# Patient Record
Sex: FEMALE | Race: WHITE | NOT HISPANIC OR LATINO | Employment: UNEMPLOYED | URBAN - METROPOLITAN AREA
[De-identification: names, ages, dates, MRNs, and addresses within clinical notes are randomized per-mention and may not be internally consistent; named-entity substitution may affect disease eponyms.]

---

## 2017-02-13 ENCOUNTER — AMBULATORY - HEALTHEAST (OUTPATIENT)
Dept: ADMINISTRATIVE | Facility: REHABILITATION | Age: 37
End: 2017-02-13

## 2017-02-13 DIAGNOSIS — M24.159 LABRAL TEAR OF HIP, DEGENERATIVE: ICD-10-CM

## 2017-02-13 DIAGNOSIS — M25.559 HIP PAIN: ICD-10-CM

## 2017-02-13 DIAGNOSIS — Z98.890 STATUS POST SURGERY: ICD-10-CM

## 2017-02-15 ENCOUNTER — OFFICE VISIT - HEALTHEAST (OUTPATIENT)
Dept: PHYSICAL THERAPY | Facility: REHABILITATION | Age: 37
End: 2017-02-15

## 2017-02-15 DIAGNOSIS — M25.551 HIP PAIN, RIGHT: ICD-10-CM

## 2017-02-15 DIAGNOSIS — Z98.890 STATUS POST ARTHROSCOPY OF HIP: ICD-10-CM

## 2017-02-15 DIAGNOSIS — M62.81 MUSCLE WEAKNESS (GENERALIZED): ICD-10-CM

## 2017-02-23 ENCOUNTER — COMMUNICATION - HEALTHEAST (OUTPATIENT)
Dept: PHYSICAL THERAPY | Facility: REHABILITATION | Age: 37
End: 2017-02-23

## 2017-03-23 ENCOUNTER — RECORDS - HEALTHEAST (OUTPATIENT)
Dept: ADMINISTRATIVE | Facility: OTHER | Age: 37
End: 2017-03-23

## 2017-03-28 ENCOUNTER — HOSPITAL ENCOUNTER (OUTPATIENT)
Dept: MRI IMAGING | Facility: CLINIC | Age: 37
Discharge: HOME | End: 2017-03-28
Attending: NURSE PRACTITIONER

## 2017-03-28 DIAGNOSIS — M25.551 RIGHT HIP PAIN: ICD-10-CM

## 2017-05-01 ENCOUNTER — OFFICE VISIT - HEALTHEAST (OUTPATIENT)
Dept: FAMILY MEDICINE | Facility: CLINIC | Age: 37
End: 2017-05-01

## 2017-05-01 DIAGNOSIS — M79.604 RIGHT LEG PAIN: ICD-10-CM

## 2017-05-01 ASSESSMENT — MIFFLIN-ST. JEOR: SCORE: 1192.97

## 2017-05-08 ENCOUNTER — RECORDS - HEALTHEAST (OUTPATIENT)
Dept: ADMINISTRATIVE | Facility: OTHER | Age: 37
End: 2017-05-08

## 2017-05-15 ENCOUNTER — RECORDS - HEALTHEAST (OUTPATIENT)
Dept: ADMINISTRATIVE | Facility: OTHER | Age: 37
End: 2017-05-15

## 2017-06-01 ENCOUNTER — AMBULATORY - HEALTHEAST (OUTPATIENT)
Dept: ADMINISTRATIVE | Facility: REHABILITATION | Age: 37
End: 2017-06-01

## 2017-06-01 DIAGNOSIS — M54.50 LOW BACK PAIN: ICD-10-CM

## 2017-06-02 ENCOUNTER — OFFICE VISIT - HEALTHEAST (OUTPATIENT)
Dept: PHYSICAL THERAPY | Facility: REHABILITATION | Age: 37
End: 2017-06-02

## 2017-06-02 DIAGNOSIS — M62.81 MUSCLE WEAKNESS (GENERALIZED): ICD-10-CM

## 2017-06-02 DIAGNOSIS — M25.551 POSTERIOR PAIN OF HIP, RIGHT: ICD-10-CM

## 2017-06-09 ENCOUNTER — OFFICE VISIT - HEALTHEAST (OUTPATIENT)
Dept: PHYSICAL THERAPY | Facility: REHABILITATION | Age: 37
End: 2017-06-09

## 2017-06-09 DIAGNOSIS — M62.81 MUSCLE WEAKNESS (GENERALIZED): ICD-10-CM

## 2017-06-09 DIAGNOSIS — M25.551 POSTERIOR PAIN OF HIP, RIGHT: ICD-10-CM

## 2017-06-15 ENCOUNTER — COMMUNICATION - HEALTHEAST (OUTPATIENT)
Dept: PHYSICAL THERAPY | Facility: REHABILITATION | Age: 37
End: 2017-06-15

## 2017-11-08 ENCOUNTER — RECORDS - HEALTHEAST (OUTPATIENT)
Dept: ADMINISTRATIVE | Facility: OTHER | Age: 37
End: 2017-11-08

## 2017-11-22 ENCOUNTER — RECORDS - HEALTHEAST (OUTPATIENT)
Dept: ADMINISTRATIVE | Facility: OTHER | Age: 37
End: 2017-11-22

## 2017-12-13 ENCOUNTER — RECORDS - HEALTHEAST (OUTPATIENT)
Dept: ADMINISTRATIVE | Facility: OTHER | Age: 37
End: 2017-12-13

## 2018-01-24 ENCOUNTER — RECORDS - HEALTHEAST (OUTPATIENT)
Dept: ADMINISTRATIVE | Facility: OTHER | Age: 38
End: 2018-01-24

## 2018-04-12 ENCOUNTER — OFFICE VISIT - HEALTHEAST (OUTPATIENT)
Dept: FAMILY MEDICINE | Facility: CLINIC | Age: 38
End: 2018-04-12

## 2018-04-12 DIAGNOSIS — R14.0 ABDOMINAL BLOATING: ICD-10-CM

## 2018-04-12 DIAGNOSIS — R63.5 WEIGHT GAIN: ICD-10-CM

## 2018-04-12 DIAGNOSIS — L65.9 HAIR LOSS: ICD-10-CM

## 2018-04-12 DIAGNOSIS — L70.0 ACNE VULGARIS: ICD-10-CM

## 2018-04-12 LAB
ALBUMIN SERPL-MCNC: 3.9 G/DL (ref 3.5–5)
ALP SERPL-CCNC: 80 U/L (ref 45–120)
ALT SERPL W P-5'-P-CCNC: 14 U/L (ref 0–45)
ANION GAP SERPL CALCULATED.3IONS-SCNC: 14 MMOL/L (ref 5–18)
AST SERPL W P-5'-P-CCNC: 15 U/L (ref 0–40)
BILIRUB SERPL-MCNC: 0.3 MG/DL (ref 0–1)
BUN SERPL-MCNC: 18 MG/DL (ref 8–22)
CALCIUM SERPL-MCNC: 9.8 MG/DL (ref 8.5–10.5)
CHLORIDE BLD-SCNC: 106 MMOL/L (ref 98–107)
CO2 SERPL-SCNC: 23 MMOL/L (ref 22–31)
CREAT SERPL-MCNC: 0.68 MG/DL (ref 0.6–1.1)
ERYTHROCYTE [DISTWIDTH] IN BLOOD BY AUTOMATED COUNT: 10.8 % (ref 11–14.5)
FSH SERPL-ACNC: <3 MIU/ML
GFR SERPL CREATININE-BSD FRML MDRD: >60 ML/MIN/1.73M2
GLUCOSE BLD-MCNC: 92 MG/DL (ref 70–125)
HCG UR QL: NEGATIVE
HCT VFR BLD AUTO: 45.9 % (ref 35–47)
HGB BLD-MCNC: 15.4 G/DL (ref 12–16)
LH SERPL-ACNC: 5.2 MIU/ML
LIPASE SERPL-CCNC: 30 U/L (ref 0–52)
MCH RBC QN AUTO: 31 PG (ref 27–34)
MCHC RBC AUTO-ENTMCNC: 33.7 G/DL (ref 32–36)
MCV RBC AUTO: 92 FL (ref 80–100)
PLATELET # BLD AUTO: 290 THOU/UL (ref 140–440)
PMV BLD AUTO: 9 FL (ref 7–10)
POTASSIUM BLD-SCNC: 4.5 MMOL/L (ref 3.5–5)
PROT SERPL-MCNC: 7 G/DL (ref 6–8)
RBC # BLD AUTO: 4.98 MILL/UL (ref 3.8–5.4)
SODIUM SERPL-SCNC: 143 MMOL/L (ref 136–145)
SP GR UR STRIP: 1.02 (ref 1–1.03)
TSH SERPL DL<=0.005 MIU/L-ACNC: 1.09 UIU/ML (ref 0.3–5)
WBC: 8.4 THOU/UL (ref 4–11)

## 2018-04-12 ASSESSMENT — MIFFLIN-ST. JEOR: SCORE: 1216.55

## 2018-04-14 LAB
TESTOST SERPL-MCNC: 16 NG/DL (ref 8–60)
TESTOSTERONE, FREE, S - HISTORICAL: 0.19 NG/DL (ref 0.06–1)

## 2018-04-16 LAB — ANA SER QL: 0.2 U

## 2018-05-02 ENCOUNTER — OFFICE VISIT - HEALTHEAST (OUTPATIENT)
Dept: FAMILY MEDICINE | Facility: CLINIC | Age: 38
End: 2018-05-02

## 2018-05-02 DIAGNOSIS — R53.83 FATIGUE, UNSPECIFIED TYPE: ICD-10-CM

## 2018-05-02 DIAGNOSIS — R51.9 ACUTE NONINTRACTABLE HEADACHE, UNSPECIFIED HEADACHE TYPE: ICD-10-CM

## 2018-05-02 DIAGNOSIS — M54.2 NECK PAIN: ICD-10-CM

## 2018-05-02 DIAGNOSIS — R11.2 NON-INTRACTABLE VOMITING WITH NAUSEA, UNSPECIFIED VOMITING TYPE: ICD-10-CM

## 2018-05-02 LAB
ERYTHROCYTE [DISTWIDTH] IN BLOOD BY AUTOMATED COUNT: 10.9 % (ref 11–14.5)
HCT VFR BLD AUTO: 41.5 % (ref 35–47)
HGB BLD-MCNC: 14.1 G/DL (ref 12–16)
MCH RBC QN AUTO: 31 PG (ref 27–34)
MCHC RBC AUTO-ENTMCNC: 33.9 G/DL (ref 32–36)
MCV RBC AUTO: 92 FL (ref 80–100)
PLATELET # BLD AUTO: 276 THOU/UL (ref 140–440)
PMV BLD AUTO: 8.6 FL (ref 7–10)
RBC # BLD AUTO: 4.53 MILL/UL (ref 3.8–5.4)
WBC: 9.5 THOU/UL (ref 4–11)

## 2018-05-02 ASSESSMENT — MIFFLIN-ST. JEOR: SCORE: 1216.55

## 2018-06-08 ENCOUNTER — OFFICE VISIT - HEALTHEAST (OUTPATIENT)
Dept: FAMILY MEDICINE | Facility: CLINIC | Age: 38
End: 2018-06-08

## 2018-06-08 DIAGNOSIS — R12 HEARTBURN: ICD-10-CM

## 2018-06-08 DIAGNOSIS — R10.11 RUQ ABDOMINAL PAIN: ICD-10-CM

## 2018-06-11 ENCOUNTER — COMMUNICATION - HEALTHEAST (OUTPATIENT)
Dept: FAMILY MEDICINE | Facility: CLINIC | Age: 38
End: 2018-06-11

## 2018-06-11 ENCOUNTER — OFFICE VISIT - HEALTHEAST (OUTPATIENT)
Dept: FAMILY MEDICINE | Facility: CLINIC | Age: 38
End: 2018-06-11

## 2018-06-11 ENCOUNTER — HOSPITAL ENCOUNTER (OUTPATIENT)
Dept: NUCLEAR MEDICINE | Facility: CLINIC | Age: 38
Discharge: HOME | End: 2018-06-11
Attending: FAMILY MEDICINE

## 2018-06-11 DIAGNOSIS — R10.11 RIGHT UPPER QUADRANT ABDOMINAL PAIN: ICD-10-CM

## 2018-06-13 ENCOUNTER — OFFICE VISIT - HEALTHEAST (OUTPATIENT)
Dept: FAMILY MEDICINE | Facility: CLINIC | Age: 38
End: 2018-06-13

## 2018-06-13 DIAGNOSIS — R10.11 ABDOMINAL PAIN, RIGHT UPPER QUADRANT: ICD-10-CM

## 2018-06-13 DIAGNOSIS — M54.6 ACUTE RIGHT-SIDED THORACIC BACK PAIN: ICD-10-CM

## 2018-06-13 DIAGNOSIS — R21 RASH: ICD-10-CM

## 2018-06-13 ASSESSMENT — MIFFLIN-ST. JEOR: SCORE: 1206.58

## 2018-06-15 LAB
VARICELLA ZOSTER DNA COMMENT: NORMAL
VZV DNA SPEC QL NAA+PROBE: NORMAL
VZV PCR SPECIMEN: NORMAL

## 2018-10-17 ENCOUNTER — RECORDS - HEALTHEAST (OUTPATIENT)
Dept: ADMINISTRATIVE | Facility: OTHER | Age: 38
End: 2018-10-17

## 2018-12-07 ENCOUNTER — OFFICE VISIT - HEALTHEAST (OUTPATIENT)
Dept: FAMILY MEDICINE | Facility: CLINIC | Age: 38
End: 2018-12-07

## 2018-12-07 DIAGNOSIS — F33.1 MODERATE EPISODE OF RECURRENT MAJOR DEPRESSIVE DISORDER (H): ICD-10-CM

## 2018-12-07 DIAGNOSIS — F90.0 ADHD, PREDOMINANTLY INATTENTIVE TYPE: ICD-10-CM

## 2018-12-07 ASSESSMENT — MIFFLIN-ST. JEOR: SCORE: 1226.53

## 2019-01-02 ENCOUNTER — COMMUNICATION - HEALTHEAST (OUTPATIENT)
Dept: FAMILY MEDICINE | Facility: CLINIC | Age: 39
End: 2019-01-02

## 2019-01-02 DIAGNOSIS — F33.1 MODERATE EPISODE OF RECURRENT MAJOR DEPRESSIVE DISORDER (H): ICD-10-CM

## 2019-08-29 ENCOUNTER — RECORDS - HEALTHEAST (OUTPATIENT)
Dept: ADMINISTRATIVE | Facility: OTHER | Age: 39
End: 2019-08-29

## 2019-09-05 ENCOUNTER — COMMUNICATION - HEALTHEAST (OUTPATIENT)
Dept: FAMILY MEDICINE | Facility: CLINIC | Age: 39
End: 2019-09-05

## 2020-01-06 ENCOUNTER — OFFICE VISIT - HEALTHEAST (OUTPATIENT)
Dept: FAMILY MEDICINE | Facility: CLINIC | Age: 40
End: 2020-01-06

## 2020-01-06 DIAGNOSIS — Z00.00 ROUTINE GENERAL MEDICAL EXAMINATION AT A HEALTH CARE FACILITY: ICD-10-CM

## 2020-01-06 DIAGNOSIS — Z13.220 LIPID SCREENING: ICD-10-CM

## 2020-01-06 DIAGNOSIS — F90.0 ADHD, PREDOMINANTLY INATTENTIVE TYPE: ICD-10-CM

## 2020-01-06 DIAGNOSIS — Z13.1 DIABETES MELLITUS SCREENING: ICD-10-CM

## 2020-01-06 DIAGNOSIS — L70.0 ACNE VULGARIS: ICD-10-CM

## 2020-01-06 LAB
CHOLEST SERPL-MCNC: 217 MG/DL
FASTING STATUS PATIENT QL REPORTED: YES
FASTING STATUS PATIENT QL REPORTED: YES
GLUCOSE BLD-MCNC: 90 MG/DL (ref 70–125)
HDLC SERPL-MCNC: 50 MG/DL
HGB BLD-MCNC: 14.2 G/DL (ref 12–16)
LDLC SERPL CALC-MCNC: 146 MG/DL
TRIGL SERPL-MCNC: 103 MG/DL

## 2020-01-06 ASSESSMENT — PATIENT HEALTH QUESTIONNAIRE - PHQ9: SUM OF ALL RESPONSES TO PHQ QUESTIONS 1-9: 0

## 2020-01-06 ASSESSMENT — MIFFLIN-ST. JEOR: SCORE: 1258.63

## 2020-01-08 ENCOUNTER — RECORDS - HEALTHEAST (OUTPATIENT)
Dept: LAB | Facility: CLINIC | Age: 40
End: 2020-01-08

## 2020-01-08 LAB
HBV SURFACE AB SERPL IA-ACNC: NEGATIVE M[IU]/ML
VZV IGG SER QL IA: POSITIVE

## 2020-01-09 ENCOUNTER — COMMUNICATION - HEALTHEAST (OUTPATIENT)
Dept: LAB | Facility: CLINIC | Age: 40
End: 2020-01-09

## 2020-01-10 LAB
GAMMA INTERFERON BACKGROUND BLD IA-ACNC: 0.01 IU/ML
M TB IFN-G BLD-IMP: NEGATIVE
MITOGEN IGNF BCKGRD COR BLD-ACNC: 0 IU/ML
MITOGEN IGNF BCKGRD COR BLD-ACNC: 0.01 IU/ML
QTF INTERPRETATION: NORMAL
QTF MITOGEN - NIL: >10 IU/ML

## 2020-03-16 ENCOUNTER — VIRTUAL VISIT (OUTPATIENT)
Dept: FAMILY MEDICINE | Facility: OTHER | Age: 40
End: 2020-03-16

## 2020-03-16 ENCOUNTER — OFFICE VISIT - HEALTHEAST (OUTPATIENT)
Dept: FAMILY MEDICINE | Facility: CLINIC | Age: 40
End: 2020-03-16

## 2020-03-16 DIAGNOSIS — R05.9 COUGH: ICD-10-CM

## 2020-03-18 NOTE — PROGRESS NOTES
"Date: 2020 12:40:15  Clinician: Candice Valdes  Clinician NPI: [id number]  Patient: Bonnie Townsend  Patient : 1980  Patient Address: [address] [place] [address]  Patient Phone: [phone]  Visit Protocol: URI  Patient Summary:  Bonnie is a 39 year old ( : 1980 ) female who initiated a Visit for COVID-19 (Coronavirus) evaluation and screening. When asked the question \"Please sign me up to receive news, health information and promotions. \", Bonnie responded \"No\".    Bonnie states her symptoms started 1-2 days ago.   Her symptoms consist of malaise, rhinitis, enlarged lymph nodes, a sore throat, a cough, and nasal congestion. Bonnie also feels feverish but was unable to measure her temperature.   Symptom details     Nasal secretions: The color of her mucus is clear.    Cough: Bonnie coughs a few times an hour and her cough is not more bothersome at night. Phlegm does not come into her throat when she coughs. She does not believe her cough is caused by post-nasal drip.     Sore throat: Bonnie reports having mild throat pain (1-3 on a 10 point pain scale), does not have exudate on her tonsils, and can swallow liquids. The lymph nodes in her neck are enlarged. A rash has not appeared on the skin since the sore throat started.      Bonnie denies having ear pain, headache, facial pain or pressure, myalgias, chills, wheezing, and teeth pain. She also denies having recent facial or sinus surgery in the past 60 days and taking antibiotic medication for the symptoms. She is not experiencing dyspnea.   Precipitating events  Within the past week, Bonnie has not been exposed to someone with strep throat. She has recently been exposed to someone with influenza. Bonnie has been in close contact with the following high risk individuals: adults 65 or older.   Pertinent COVID-19 (Coronavirus) information  Bonnie has not traveled internationally or to the areas where COVID-19 (Coronavirus) is widespread in the last 14 days before the " start of her symptoms.   Bonnie has had close contact with a suspected or laboratory-confirmed COVID-19 patient within 14 days of symptom onset.   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   Pertinent medical history  Bonnie does not get yeast infections when she takes antibiotics.   Bonnie needs a return to work/school note.   Weight: 135 lbs   Bonnie does not smoke or use smokeless tobacco.   She denies pregnancy and denies breastfeeding. She does not menstruate.   Weight: 135 lbs    MEDICATIONS: Strattera oral, ALLERGIES: NKDA  Clinician Response:  Dear Bonnie,   Based on the information you have provided, it is recommended that you go to one of our designated Coronavirus (Covid-19) testing centers to get a test done from your car.To do this follow these instructions:  You should go to one of our dedicated testing centers as soon as able during the hours below at one of these locations:    Walk-in Care: [facility] at [address] [address], [place] [address]. Hours: M-F 7am - 6pm, Sat-Sun 8am -- 3pm   M [facility] at [address] [address]. Hours: Every Day 9am -- 7pm  Walk-in Care: [facility] at [address] [place] [address]. Hours: M-F 7am - 6pm, Sat-Sun 8am -- 3pm  M [address] [address]. Hours: M-F 11am -- 7pm, Sat-Sun 9am-4pm  [facility] ([facility])[address], [place] [address].Hours: M-F 730-5     What to expect:    When you arrive please come park in the parking lot.  Be prepared to present photo ID  Call [phone] (For [facility] location call [phone]) and let them know which clinic you are at, the description of your car and where you are parked. Mention you did an [facility] visit and were sent for testing.  On that phone call you will give them the information to register your for the visit.  They will add you to the queue to get your test  (you will stay in your car the entire time).  You will then be met by a provider who will perform a brief assessment in your car and collect samples to test for coronavirus and possibly influenza or RSV.From now until your test results return, please follow self-isolation practices:Isolate Yourself:     Isolate yourself while traveling.  Do Not allow any visitors within 6 feet.  Do Not go to work or school.  Do Not go to Faith,  centers, shopping, or other public places.  Do Not shake hands.  Avoid close contact with others (hugging, kissing).Protect Others:     Cover Your Mouth and Nose with a mask, disposable tissue or wash cloth to avoid spreading germs to others.  Wash your hands and face frequently with soap and water      Fever Medicines:    For fever relief, take acetaminophen or ibuprofen.  Treat fevers above 101deg F (38.3deg C) to lower fevers and make you more comfortable.   Acetaminophen (e.g., Tylenol): Take 650 mg (two 325 mg pills) by mouth every 4-6 hours as needed of regular strength Tylenol or 1,000 mg (two 500 mg pills) every 8 hours as needed of Extra Strength Tylenol.   Ibuprofen (e.g., Motrin, Advil): Take 400 mg (two 200 mg pills) by mouth every 6 hours as needed.   Acetaminophen is thought to be safer than ibuprofen or naproxen for people over 65 years old. Acetaminophen is in many OTC and prescription medicines. It might be in more than one medicine that you are taking. You need to be careful and not take an overdose. Before taking any medicine, read all the instructions on the package.  Caution -NSAIDs (e.g., ibuprofen, naproxen): Do not take nonsteroidal anti-inflammatory drugs (NSAIDs) if you have stomach problems, kidney disease, heart failure, or other contraindications to using this type of medicine. Do not take NSAID medicines for over 7 days without consulting your PCP. Do not take NSAID medicines if you are pregnant. Do not take NSAID medicines if you are also taking  blood thinners.    Call Back If: Breathing difficulty develops or you become worse.  Thank you for limiting contact with others, wearing a simple mask to cover your cough, practice good hand hygiene habits and accessing our virtual services where possible to limit the spread of this virus.     For more information about COVID19 and options for caring for yourself at home, please visit the CDC website at https://www.cdc.gov/coronavirus/2019-ncov/about/steps-when-sick.html  For more options for care at [facility], please visit our website at [email or web address]    COVID-19 (Coronavirus) General Information  With the increase in the number of COVID-19 (Coronavirus) cases, we understand you may have some questions. Below is some helpful information on COVID-19 (Coronavirus).  How can I protect myself and others from the COVID-19 (Coronavirus)?  Because there is currently no vaccine to prevent infection, the best way to protect yourself is to avoid being exposed to this virus. Put distance between yourself and other people if COVID-19 (Coronavirus) is spreading in your community. The virus is thought to spread mainly from person-to-person.     Between people who are in close contact with one another (within about 6 about) for prolonged period (10 minutes or longer).    Through respiratory droplets produced when an infected person coughs or sneezes.     The CDC recommends the following additional steps to protect yourself and others:     Wash your hands often with soap and water for at least 20 seconds, especially after blowing your nose, coughing, or sneezing; going to the bathroom; and before eating or preparing food.  Use an alcohol-based hand  that contains at least 60 percent alcohol if soap and water are not available.        Avoid touching your eyes, nose and mouth with unwashed hands.    Avoid close contact with people who are sick.    Stay home when you are sick.     Cover your cough or sneeze with a tissue, then throw the tissue in the trash.    Clean and disinfect frequently touched objects and surfaces.     You can help stop COVID-19 (Coronavirus) by knowing the signs and symptoms:     Fever    Cough    Shortness of breath     Contact your healthcare provider if   Develop symptoms   AND   Have been in close contact with a person known to have COVID-19 (Coronavirus) or live in or have recently traveled from an area with ongoing spread of COVID-19 (Coronavirus). Call ahead before you go to a doctor's office or emergency room. Tell them about your recent travel and your symptoms.   For the most up to date information, visit the CDC's website.  Steps to help prevent the spread of COVID-19 (Coronavirus) if you are sick  If you are sick with COVID-19 (Coronavirus) or suspect you are infected with the virus that causes COVID-19 (Coronavirus), follow the steps below to help prevent the disease from spreading&nbsp;to people in your home and community.     Stay home except to get medical care. Home isolation may be started in consultation with your healthcare clinician.    Separate yourself from other people and animals in your home.    Call ahead before visiting your doctor if you have a medical appointment.    Wear a facemask when you are around other people.    Cover your cough and sneezes.    Clean your hands often.    Avoid sharing personal household items.    Clean and disinfect frequently touched objects and surfaces everyday.    You will need to have someone drop off medications or household supplies (if needed) at your house without coming inside or in contact with you or others living in your house.    Monitor your symptoms and seek prompt medical care if your illness is worsening (e.g. Difficulty breathing).    Discontinue home isolation only in consultation with your healthcare provider.     For more detailed and up to date information on what to do if you are sick, visit  "this link: What to Do If You Are Sick With Coronavirus Disease 2019 (COVID-19).  Do I need to be tested for COVID-19 (Coronavirus)?     At this time, the limited number of tests available are controlled by the state and local health departments and are being reserved for more seriously ill patients, those with known exposure to confirmed patients, and those with recent travel (within 14 days) to countries with high rates of COVID-19 (Coronavirus).    Decisions on which patients receive testing will be based on the local spread of COVID-19 (Coronavirus) as well as the symptoms. Your healthcare provider will make the final decision on whether you should be tested.    In the meantime, if you have concerns that you may have been exposed, it is reasonable to practice \"social distancing.\"&nbsp; If you are ill with a cold or flu-like illness, please monitor your symptoms and reach out to your healthcare provider if your symptoms worsen.    For more up to date information, visit this link: COVID-19 (Coronavirus) Frequently Asked Questions and Answers.      Diagnosis: Cough  Diagnosis ICD: R05  "

## 2020-03-27 LAB
COVID OVERALL RESULT - HISTORICAL: NOT DETECTED
PAN SARS RNA (HISTORICAL CONVERSION): NEGATIVE
PATIENT SYMPTOMATIC (HISTORICAL CONVERSION): NORMAL
SARS COV 2 RNA - HISTORICAL: NEGATIVE
SOURCE ARUP COVID - HISTORICAL: NORMAL

## 2020-03-28 ENCOUNTER — COMMUNICATION - HEALTHEAST (OUTPATIENT)
Dept: SCHEDULING | Facility: CLINIC | Age: 40
End: 2020-03-28

## 2020-10-02 ENCOUNTER — VIRTUAL VISIT (OUTPATIENT)
Dept: FAMILY MEDICINE | Facility: OTHER | Age: 40
End: 2020-10-02

## 2020-10-02 NOTE — PROGRESS NOTES
"Date: 10/02/2020 12:07:54  Clinician: Wale Sanchez  Clinician NPI: [id number]  Patient: Bonnie Townsend  Patient : 1980  Patient Address: [address] [place] [address]  Patient Phone: [phone]  Visit Protocol: URI  Patient Summary:  Bonnie is a 39 year old ( : 1980 ) female who initiated a [facility] Visit for COVID-19 (Coronavirus) evaluation and screening. When asked the question \"Please sign me up to receive news, health information and promotions. \", Bonnie responded \"No\".    Bonnie states her symptoms started 1-2 days ago.   Her symptoms consist of a headache, nasal congestion, myalgia, chills, and malaise.   Symptom details     Nasal secretions: The color of her mucus is clear.    Headache: She states the headache is mild (1-3 on a 10 point pain scale).      Bonnie denies having ear pain, wheezing, fever, enlarged lymph nodes, cough, anosmia, vomiting, rhinitis, nausea, facial pain or pressure, sore throat, teeth pain, ageusia, and diarrhea. She also denies taking antibiotic medication in the past month and having recent facial or sinus surgery in the past 60 days. She is not experiencing dyspnea.   Precipitating events  She has not recently been exposed to someone with influenza. Bonnie has not been in close contact with any high risk individuals.   Pertinent COVID-19 (Coronavirus) information  In the past 14 days, Bnonie has not worked in a congregate living setting.   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   Bonnie also has not lived in a congregate living setting in the past 14 days. She does not live with a healthcare worker.   Bonnie has not had a close contact with a laboratory-confirmed COVID-19 patient within 14 days of symptom onset.   Since 2019, Bonnie and has not had upper respiratory infection or influenza-like illness. Has not been diagnosed with lab-confirmed COVID-19 test   Pertinent medical history  Bonnie " does not get yeast infections when she takes antibiotics.   Bonnie does not need a return to work/school note.   Weight: 130 lbs   Bonnie does not smoke or use smokeless tobacco.   She denies pregnancy and denies breastfeeding. She does not menstruate.   Weight: 130 lbs    MEDICATIONS: Concerta oral, ALLERGIES: NKDA  Clinician Response:  Dear Bonnie,   Your symptoms show that you may have coronavirus (COVID-19). This illness can cause fever, cough and trouble breathing. Many people get a mild case and get better on their own. Some people can get very sick.  What should I do?  We would like to test you for this virus.   1. Please call [phone] to schedule your visit. Explain that you were referred by [facility] to have a COVID-19 test. Be [facility].  The following will serve as your written order for this COVID Test, ordered by me, for the indication of suspected COVID [Z20.828]: The test will be ordered in [email or web address], our electronic health record, after you are scheduled. It will show as ordered and authorized by Al Otero MD.  Order: COVID-19 (Coronavirus) PCR for SYMPTOMATIC testing from [facility].      2. When it's time for your COVID test:  Stay at least 6 feet away from others. (If someone will drive you to your test, stay in the backseat, as far away from the  as you can.)   Cover your mouth and nose with a mask, tissue or washcloth.  Go straight to the testing site. Don't make any stops on the way there or back.      3.Starting now: Stay home and away from others (self-isolate) until:   You've had no fever---and no medicine that reduces fever---for one full day (24 hours). And...   Your other symptoms have gotten better. For example, your cough or breathing has improved. And...   At least 10 days have passed since your symptoms started.       During this time, don't leave the house except for testing or medical care.   Stay in your own room, even for meals. Use your own bathroom if you  "can.   Stay away from others in your home. No hugging, kissing or shaking hands. No visitors.  Don't go to work, school or anywhere else.    Clean \"high touch\" surfaces often (doorknobs, counters, handles, etc.). Use a household cleaning spray or wipes. You'll find a full list of  on the EPA website: www.epa.gov/pesticide-registration/list-n-disinfectants-use-against-sars-cov-2.   Cover your mouth and nose with a mask, tissue or washcloth to avoid spreading germs.  Wash your hands and face often. Use soap and water.  Caregivers in these groups are at risk for severe illness due to COVID-19:  o People 65 years and older  o People who live in a nursing home or long-term care facility  o People with chronic disease (lung, heart, cancer, diabetes, kidney, liver, immunologic)  o People who have a weakened immune system, including those who:   Are in cancer treatment  Take medicine that weakens the immune system, such as corticosteroids  Had a bone marrow or organ transplant  Have an immune deficiency  Have poorly controlled HIV or AIDS  Are obese (body mass index of 40 or higher)  Smoke regularly   o Caregivers should wear gloves while washing dishes, handling laundry and cleaning bedrooms and bathrooms.  o Use caution when washing and drying laundry: Don't shake dirty laundry, and use the warmest water setting that you can.  o For more tips, go to www.cdc.gov/coronavirus/2019-ncov/downloads/10Things.pdf.    4.Sign up for [employer]. We know it's scary to hear that you might have COVID-19. We want to track your symptoms to make sure you're okay over the next 2 weeks. Please look for an email from [employer]---this is a free, online program that we'll use to keep in touch. To sign up, follow the link in the email. Learn more at [email or web address]  How can I take care of myself?   Get lots of rest. Drink extra fluids (unless a doctor has told you not to).   Take Tylenol (acetaminophen) for fever " or pain. If you have liver or kidney problems, ask your family doctor if it's okay to take Tylenol.   Adults can take either:    650 mg (two 325 mg pills) every 4 to 6 hours, or...   1,000 mg (two 500 mg pills) every 8 hours as needed.    Note: Don't take more than 3,000 mg in one day. Acetaminophen is found in many medicines (both prescribed and over-the-counter medicines). Read all labels to be sure you don't take too much.   For children, check the Tylenol bottle for the right dose. The dose is based on the child's age or weight.    If you have other health problems (like cancer, heart failure, an organ transplant or severe kidney disease): Call your specialty clinic if you don't feel better in the next 2 days.       Know when to call 911. Emergency warning signs include:    Trouble breathing or shortness of breath Pain or pressure in the chest that doesn't go away Feeling confused like you haven't felt before, or not being able to wake up Bluish-colored lips or face.  Where can I get more information?   [facility] -- About COVID-19: [email or web address]   CDC -- What to Do If You're Sick: www.cdc.gov/coronavirus/2019-ncov/about/steps-when-sick.html   [facility] -- Ending Home Isolation: www.cdc.gov/coronavirus/2019-ncov/hcp/disposition-in-home-patients.html   CDC -- Caring for Someone: www.cdc.gov/coronavirus/2019-ncov/if-you-are-sick/care-for-someone.html   [facility] -- Interim Guidance for Hospital Discharge to Home: www.health.[facility].[place]/diseases/coronavirus/hcp/hospdischarge.pdf   [facility] clinical trials (COVID-19 research studies): [email or web address].[facility]/umn-clinical-trials    Below are the COVID-19 hotlines at the [place] Department of Health ([facility]). Interpreters are available.    For health questions: Call 208-763-0255 or 1-989.513.5407 (7 a.m. to 7 p.m.) For questions about schools and childcare: Call 867-469-0588 or 1-422.217.1196 (7 a.m. to 7 p.m.)    COVID-19 (Coronavirus)  General Information  Because there is currently no vaccine to prevent infection, the best way to protect yourself is to avoid being exposed to this virus. Common symptoms of COVID-19 include but are not limited to fever, cough, and shortness of breath. These symptoms appear 2-14 days after you are exposed to the virus that causes COVID-19. Click here for more information from the CDC on how to protect yourself.  If you are sick with COVID-19 or suspect you are infected with the virus that causes COVID-19, follow the steps here from the CDC to help prevent the disease from spreading to people in your home and community.  Click here for general information from the CDC on testing.  If you develop any of these emergency warning signs for COVID-19, get medical attention immediately:     Trouble breathing    Persistent pain or pressure in the chest    New confusion or inability to arouse    Bluish lips or face      Call your doctor or clinic before going in. Call 911 if you have a medical emergency and notify the  you have or think you may have COVID-19.  For more detailed and up to date information on COVID-19 (Coronavirus), please visit the CDC website.   Diagnosis: Other malaise  Diagnosis ICD: R53.81

## 2020-10-07 ENCOUNTER — OFFICE VISIT - HEALTHEAST (OUTPATIENT)
Dept: FAMILY MEDICINE | Facility: CLINIC | Age: 40
End: 2020-10-07

## 2020-10-07 DIAGNOSIS — H92.03 OTALGIA OF BOTH EARS: ICD-10-CM

## 2020-10-07 DIAGNOSIS — Z20.822 SUSPECTED COVID-19 VIRUS INFECTION: ICD-10-CM

## 2020-10-07 DIAGNOSIS — R05.9 COUGH: ICD-10-CM

## 2020-12-09 ENCOUNTER — RECORDS - HEALTHEAST (OUTPATIENT)
Dept: ADMINISTRATIVE | Facility: OTHER | Age: 40
End: 2020-12-09

## 2020-12-18 ENCOUNTER — AMBULATORY - HEALTHEAST (OUTPATIENT)
Dept: FAMILY MEDICINE | Facility: CLINIC | Age: 40
End: 2020-12-18

## 2020-12-18 ENCOUNTER — AMBULATORY - HEALTHEAST (OUTPATIENT)
Dept: LAB | Facility: CLINIC | Age: 40
End: 2020-12-18

## 2020-12-18 DIAGNOSIS — Z11.1 SCREENING EXAMINATION FOR PULMONARY TUBERCULOSIS: ICD-10-CM

## 2020-12-24 LAB
GAMMA INTERFERON BACKGROUND BLD IA-ACNC: 0.1 IU/ML
M TB IFN-G BLD-IMP: NEGATIVE
MITOGEN IGNF BCKGRD COR BLD-ACNC: 0.01 IU/ML
MITOGEN IGNF BCKGRD COR BLD-ACNC: 0.03 IU/ML
QTF INTERPRETATION: NORMAL
QTF MITOGEN - NIL: >10 IU/ML

## 2021-01-22 ENCOUNTER — RECORDS - HEALTHEAST (OUTPATIENT)
Dept: ADMINISTRATIVE | Facility: OTHER | Age: 41
End: 2021-01-22

## 2021-03-23 ENCOUNTER — OFFICE VISIT - HEALTHEAST (OUTPATIENT)
Dept: FAMILY MEDICINE | Facility: CLINIC | Age: 41
End: 2021-03-23

## 2021-03-23 DIAGNOSIS — G47.00 INSOMNIA, UNSPECIFIED TYPE: ICD-10-CM

## 2021-03-23 DIAGNOSIS — J30.9 ALLERGIC RHINITIS, UNSPECIFIED SEASONALITY, UNSPECIFIED TRIGGER: ICD-10-CM

## 2021-03-23 DIAGNOSIS — F90.0 ADHD, PREDOMINANTLY INATTENTIVE TYPE: ICD-10-CM

## 2021-03-23 RX ORDER — ZOLPIDEM TARTRATE 10 MG/1
10 TABLET ORAL
Status: SHIPPED | COMMUNITY
Start: 2020-07-07 | End: 2022-01-27

## 2021-03-23 RX ORDER — LISDEXAMFETAMINE DIMESYLATE 50 MG
50 CAPSULE ORAL DAILY
Status: SHIPPED | COMMUNITY
Start: 2021-03-18 | End: 2021-12-06

## 2021-03-23 RX ORDER — METHYLPHENIDATE HYDROCHLORIDE 10 MG/1
10 TABLET ORAL PRN
Status: SHIPPED | COMMUNITY
Start: 2021-03-02

## 2021-03-23 RX ORDER — MONTELUKAST SODIUM 10 MG/1
10 TABLET ORAL DAILY
Qty: 30 TABLET | Refills: 0 | Status: SHIPPED | OUTPATIENT
Start: 2021-03-23 | End: 2022-01-27

## 2021-03-23 ASSESSMENT — PATIENT HEALTH QUESTIONNAIRE - PHQ9: SUM OF ALL RESPONSES TO PHQ QUESTIONS 1-9: 0

## 2021-04-08 ENCOUNTER — COMMUNICATION - HEALTHEAST (OUTPATIENT)
Dept: FAMILY MEDICINE | Facility: CLINIC | Age: 41
End: 2021-04-08

## 2021-04-09 ENCOUNTER — OFFICE VISIT - HEALTHEAST (OUTPATIENT)
Dept: FAMILY MEDICINE | Facility: CLINIC | Age: 41
End: 2021-04-09

## 2021-04-09 ENCOUNTER — COMMUNICATION - HEALTHEAST (OUTPATIENT)
Dept: FAMILY MEDICINE | Facility: CLINIC | Age: 41
End: 2021-04-09

## 2021-04-09 DIAGNOSIS — R53.83 FATIGUE, UNSPECIFIED TYPE: ICD-10-CM

## 2021-04-09 DIAGNOSIS — T50.Z95A ADVERSE EFFECT OF VACCINE, INITIAL ENCOUNTER: ICD-10-CM

## 2021-04-09 DIAGNOSIS — R22.0 TONGUE SWELLING: ICD-10-CM

## 2021-04-09 DIAGNOSIS — R11.0 NAUSEA: ICD-10-CM

## 2021-04-29 ENCOUNTER — OFFICE VISIT - HEALTHEAST (OUTPATIENT)
Dept: FAMILY MEDICINE | Facility: CLINIC | Age: 41
End: 2021-04-29

## 2021-04-29 ENCOUNTER — RECORDS - HEALTHEAST (OUTPATIENT)
Dept: FAMILY MEDICINE | Facility: CLINIC | Age: 41
End: 2021-04-29

## 2021-04-29 DIAGNOSIS — Z20.822 SUSPECTED COVID-19 VIRUS INFECTION: ICD-10-CM

## 2021-05-25 ENCOUNTER — RECORDS - HEALTHEAST (OUTPATIENT)
Dept: ADMINISTRATIVE | Facility: CLINIC | Age: 41
End: 2021-05-25

## 2021-05-26 ASSESSMENT — PATIENT HEALTH QUESTIONNAIRE - PHQ9: SUM OF ALL RESPONSES TO PHQ QUESTIONS 1-9: 0

## 2021-05-27 ASSESSMENT — PATIENT HEALTH QUESTIONNAIRE - PHQ9: SUM OF ALL RESPONSES TO PHQ QUESTIONS 1-9: 0

## 2021-05-28 ENCOUNTER — RECORDS - HEALTHEAST (OUTPATIENT)
Dept: ADMINISTRATIVE | Facility: CLINIC | Age: 41
End: 2021-05-28

## 2021-05-29 ENCOUNTER — RECORDS - HEALTHEAST (OUTPATIENT)
Dept: ADMINISTRATIVE | Facility: CLINIC | Age: 41
End: 2021-05-29

## 2021-05-30 ENCOUNTER — RECORDS - HEALTHEAST (OUTPATIENT)
Dept: ADMINISTRATIVE | Facility: CLINIC | Age: 41
End: 2021-05-30

## 2021-05-30 VITALS — WEIGHT: 125.6 LBS | BODY MASS INDEX: 23.11 KG/M2 | HEIGHT: 62 IN

## 2021-05-31 ENCOUNTER — RECORDS - HEALTHEAST (OUTPATIENT)
Dept: ADMINISTRATIVE | Facility: CLINIC | Age: 41
End: 2021-05-31

## 2021-06-01 ENCOUNTER — RECORDS - HEALTHEAST (OUTPATIENT)
Dept: ADMINISTRATIVE | Facility: CLINIC | Age: 41
End: 2021-06-01

## 2021-06-01 VITALS — WEIGHT: 129.2 LBS | BODY MASS INDEX: 23.63 KG/M2

## 2021-06-01 VITALS — BODY MASS INDEX: 24.07 KG/M2 | HEIGHT: 62 IN | WEIGHT: 130.8 LBS

## 2021-06-01 VITALS — HEIGHT: 62 IN | WEIGHT: 130.8 LBS | BODY MASS INDEX: 24.07 KG/M2

## 2021-06-01 VITALS — WEIGHT: 130.7 LBS | BODY MASS INDEX: 23.91 KG/M2

## 2021-06-01 VITALS — WEIGHT: 128.6 LBS | BODY MASS INDEX: 23.66 KG/M2 | HEIGHT: 62 IN

## 2021-06-02 ENCOUNTER — RECORDS - HEALTHEAST (OUTPATIENT)
Dept: ADMINISTRATIVE | Facility: CLINIC | Age: 41
End: 2021-06-02

## 2021-06-02 VITALS — BODY MASS INDEX: 24.48 KG/M2 | HEIGHT: 62 IN | WEIGHT: 133 LBS

## 2021-06-04 VITALS
WEIGHT: 139.2 LBS | BODY MASS INDEX: 25.62 KG/M2 | OXYGEN SATURATION: 99 % | DIASTOLIC BLOOD PRESSURE: 82 MMHG | HEIGHT: 62 IN | HEART RATE: 91 BPM | SYSTOLIC BLOOD PRESSURE: 120 MMHG

## 2021-06-04 NOTE — PROGRESS NOTES
Assessment and Plan:    1. Routine general medical examination at a health care facility  Discussed consuming a healthy diet and exercising.  She states she is up-to-date on vaccinations.  She received a tetanus vaccine last year and an influenza vaccine this fall.  I completed paperwork for nursing school.  - Hemoglobin    2. Diabetes mellitus screening  - Glucose    3. Lipid screening  - Lipid Cascade    4. ADHD, predominantly inattentive type  She continues to see psychiatry.  This is controlled with Strattera.    5. Acne vulgaris  She continues spironolactone for acne which is prescribed by dermatology.      Subjective:     Bonnie is a 39 y.o. female presenting to the clinic for a female physical.     LMP: no periods since IUD placed 1 1/2 years ago   Hx of abnormal pap smear: none   Last pap smear: 1 1/2 years ago at [facility]   Perform self-breast exams: yes   Vaginal discharge or irritation: none   Sexually active: in a relationship for five years   Contraception: IUD   Concerns for STDs: none   Previous pregnancies:one ectopic pregnancy in , two other pregnancies (one vaginal, one )     She is taking spironolactone for acne which is prescribed by [facility].  She sees psychiatry at [facility] and is prescribed Strattera.  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  Overall, she feels well today and has no other concerns.    Review of systems:  I performed a 10 point review of systems.  All pertinent positives and negatives are noted in the HPI. All others are negative.     No Known Allergies    Current Outpatient Medications on File Prior to Visit   Medication Sig Dispense Refill     atomoxetine (STRATTERA) 18 MG capsule        atomoxetine (STRATTERA) 40 MG capsule        levonorgestrel (MIRENA) 20 mcg/24 hr (5 years) IUD 1 each by Intrauterine route once.       spironolactone (ALDACTONE) 100 MG tablet         methylphenidate (RITALIN) 20 MG tablet Take 32 mg by mouth as needed.   0     methylphenidate HCl (RITALIN) 10 MG tablet Take 10 mg by mouth.       sertraline (ZOLOFT) 50 MG tablet TAKE 1 TABLET BY MOUTH EVERY DAY 90 tablet 1     No current facility-administered medications on file prior to visit.        Social History     Socioeconomic History     Marital status:      Spouse name: Not on file     Number of children: Not on file     Years of education: Not on file     Highest education level: Not on file   Occupational History     Not on file   Social Needs     Financial resource strain: Not on file     Food insecurity:     Worry: Not on file     Inability: Not on file     Transportation needs:     Medical: Not on file     Non-medical: Not on file   Tobacco Use     Smoking status: Former Smoker     Smokeless tobacco: Never Used     Tobacco comment: Social    Substance and Sexual Activity     Alcohol use: Yes     Alcohol/week: 2.0 - 3.0 standard drinks     Types: 2 - 3 Glasses of wine per week     Drug use: No     Sexual activity: Yes     Partners: Male     Birth control/protection: I.U.D.   Lifestyle     Physical activity:     Days per week: Not on file     Minutes per session: Not on file     Stress: Not on file   Relationships     Social connections:     Talks on phone: Not on file     Gets together: Not on file     Attends Taoism service: Not on file     Active member of club or organization: Not on file     Attends meetings of clubs or organizations: Not on file     Relationship status: Not on file     Intimate partner violence:     Fear of current or ex partner: Not on file     Emotionally abused: Not on file     Physically abused: Not on file     Forced sexual activity: Not on file   Other Topics Concern     Not on file   Social History Narrative     Not on file       Past Medical History:   Diagnosis Date     ADHD (attention deficit hyperactivity disorder)      Hip pain     right     PONV  (postoperative nausea and vomiting)     Nausea       No family history on file.    Past Surgical History:   Procedure Laterality Date     APPENDECTOMY        SECTION       HIP ARTHROSCOPY Right 10/25/2016    Procedure: RIGHT HIP ARTHROSCOPY PINCER RESECTION LABRAL REPAIR CAPSULAR CLOSURE;  Surgeon: Myron Nunez MD;  Location: [facility];  Service:      LAPAROSCOPY FOR ECTOPIC PREGNANCY         Objective:     Vitals:    20 1053   BP: 120/82   Pulse: 91   SpO2: 99%       Patient is alert, no obvious distress.   Skin: Warm, dry.  No rashes or lesions. Skin turgor rapid return.   HEENT:  Eyes normal.  Ears normal.  Nose patent, mucosa pink.  Oropharynx mucosa pink, no lesions or tonsil enlargement.   Neck:  Supple, without lymphadenopathy, bruits, JVD. Thyroid normal texture and size.    Lungs:  Clear to auscultation.  No wheezing, rales noted.  Respirations even and unlabored.   Heart:  Regular rate and rhythm.  No murmurs.   Breasts:  Deferred per patient   Abdomen: Soft, nontender.  No organomegaly.  Bowel sounds normoactive.  No guarding or masses noted.   : deferred  Musculoskeletal:  Full ROM of extremities.  Muscle strength equal +5/5.   Neurological:  Cranial nerves 2-12 intact.

## 2021-06-06 NOTE — PATIENT INSTRUCTIONS - HE
You are being tested for Corona virus and possibly Influenza and/or RSV.    We will call you with your results.    Isolate Yourself:    Isolate yourself while traveling.    Do Not allow any visitors within 6 feet.    Do Not go to work or school.    Do Not go to Shinto,  centers, shopping, or other public places.    Do Not shake hands.    Avoid close contact with others (hugging, kissing).    Protect Others:    Cover Your Mouth and Nose with a mask, disposable tissue or wash cloth to avoid spreading germs to others.    Wash your hands and face frequently with soap and water    Call Back If: Breathing difficulty develops or you become worse.    For more information about COVID19 and options for caring for yourself at home, please visit the CDC website at https://www.cdc.gov/coronavirus/2019-ncov/about/steps-when-sick.html  For more options for care at [facility], please visit our website at [email or web address]

## 2021-06-06 NOTE — PROGRESS NOTES
SUBJECTIVE: Here for curbside evaluation for coronaviruse referred through [facility].     Reports no new symptoms.     OBJECTIVE: no apparent distress  Eyes appear normal  Mucous membranes moist  Non diaphoretic.   No increased work of breathing   Mental status appears normal/affect normal       1. Cough  Coronavirus SARS-CoV-2 by PCR     Over the counter meds and isolation discussed until results in.   Education information given. Time of visit was 15 minutes more than half in coordination of care and counseling regarding COVID and self-isolation        SWEETIE Mcgee, CNP

## 2021-06-07 NOTE — TELEPHONE ENCOUNTER
Coronavirus (COVID-19) Notification    Patient notified of Negative COVID-19.    Patient can discontinue Quarantee and is free to resume normal activites.  If Patient has questions that you are not able to answer they can contact PCP or MD hotline ([phone])    Please Contact your PCP clinic or return to the Emergency department if your:    Symptoms worsen or other concerning symptom's.  Marian/Akua Menezes

## 2021-06-08 NOTE — PROGRESS NOTES
[facility]   Hip Initial Evaluation    Patient Name: Bonnie Townsend  Date of evaluation: 2/15/2017  Referral Diagnosis: Hip pain  Referring provider: Myron Nunez MD  Visit Diagnosis:     ICD-10-CM    1. Hip pain, right M25.551    2. Status post arthroscopy of hip Z98.890    3. Muscle weakness (generalized) M62.81        Assessment:        Bonnie Townsend is a 36 y.o. female who presents to therapy today with chief complaints of R hip pain s/p hip arthroscopy pincer resection labral repair capsular closure. Onset date of sx was 10/25/16, which was the date of surgery.  Pt reported h/o a degenerative hip that required surgery.  She has continued with the exercises given to her at her past s/p PT appointments.  Pain symptoms are achy, sharp, throbbing, and she gets mm spasms.  Her pain seems to be stemming from her hip rotator and gluteal mm.  Functional impairments include sitting for long periods, stairs, walking more than 20 minutes, limited hip ROM.  Pt demo's signs and sx consistent with s/p R hip pain.  She is appropriate for skilled PT services in order to decrease pain, improve ROM, improve strength, and improve functional mobility.    Goals:  Pt. will be independent with home exercise program in : 6 weeks  Pt will: be able to drive without pain; in 12 weeks  Pt will: be able to return to walking/hiking; in 12 weeks  Pt will: be able to return to full work duties; in 12 weeks    Patient's expectations/goals are realistic.    Barriers to Learning or Achieving Goals:  No Barriers.       Plan / Patient Instructions:        Plan of Care:   Communication with: Referral Source  Patient Related Instruction: Nature of Condition;Treatment plan and rationale;Self Care instruction;Basis of treatment;Body mechanics;Posture  Times per Week: 1  Number of Weeks: 12  Number of Visits: 12  Precautions / Restrictions : None  Therapeutic Exercise: ROM;Stretching;Strengthening  Neuromuscular Reeducation: kinesio  tape;posture;balance/proprioception;core  Manual Therapy: soft tissue mobilization;myofascial release;joint mobilization;muscle energy;strain counterstrain    Plan for next visit: Continue with hip protocol.     Subjective:      The patient reports that she had to stop PT because she didn't have insurance and couldn't afford it.  She was doing the exercises but has not progressed.  SI joint started bothering her more recently.  She is only able to drive 10 minutes before she gets pain.  The entire leg cramps up and her R foot still hurts.  L hip is feeling good.    Social information:   Living Situation:single family home and stairs  with railing   Occupation: Nursing Assistant   Work Status:Working part time   Equipment Available: None and crutches    Pain Ratin  Pain rating at best: 1  Pain rating at worst: 8  Pain description: deep spasm, achy, throbbing, sharp    Functional limitations are described as occurring with:   sitting for long periods, stairs, walking more than 20 minutes, limited hip ROM.    Patient reports benefit from:  rest  , anti-inflammatory, heat, cold, physical therapy       Objective:      Note: Items left blank indicates the item was not performed or not indicated at the time of the evaluation.    Patient Outcome Measures :    Lower Extremity Functional Scale (_/80): 30   Scores range from 0-80, where a score of 80 represents maximum function. The minimal clinically important difference is a positive change of 9 points.    Hip Examination  1. Hip pain, right     2. Status post arthroscopy of hip     3. Muscle weakness (generalized)       Involved Side: Right  Posture Observation:      General standing posture is fair.  Assistive Device: None  Gait Observation: decreased hip extension on the R.  No antalgic gait noticed.  Lumbar Clearing: Does not provoke symptoms, pain in low back with flexion and L side bending.    Hip ROM:    Date: 2/15/17     Hip ROM( ) AROM in degrees AROM in degrees  AROM in degrees    Right Left Right Left Right Left   Hip Flexion (0-120 ) Pain in groin WNL       Hip Abduction (0-45 ) WNL WNL       Hip External Rotation (0-50 ) Pain in hip  19 33       Hip Internal Rotation (0-40 ) Pain in groin  12 32       Hip Extension (0-15 )          PROM in degrees PROM in degrees PROM in degrees    Right Left Right Left Right Left   Hip Flexion (0-120 )         Hip Abduction (0-45 )         Hip External Rotation (0-50 )         Hip Internal Rotation (0-40 )         Hip Extension (0-15 )           Hip/Knee Strength     Date: 2/15/17     Hip/Knee Strength (/5) MMT MMT MMT    Right Left Right Left Right Left   Hip Flexion 4+ 4+       Hip Abduction         Hip Adduction         Hip Extension 4 4       Hip External Rotation         Hip Internal Rotation         Knee Extension 5 5       Knee Flexion 5 5         Flexibility: Decreased hip flexibility bilaterally.  Palpation: Tender bilateral PSIS and R hip rotator/gluteal mm.    Hip Special Tests     OA Right (+/-) Left (+/-) Intra Articular Right (+/-) Left (+/-)   Hip Scour   HUMA     Test Cluster  -Hip pain  -Hip IR <15   -Hip Flex <115    FADIR     Test Cluster  -Painful Hip IR  ->50 years old  -Morning Stiffness <60 min   Passive Supine Rotation Test     Misc. Right (+/-) Left (+/-) Stinchfield Test (SLR Against Resistance)     Ely s   ERICKRIT     Richa s   DIRI     Trendelenburg   Posterior Rim Impingement     SIJ Right (+/-) Left (+/-) Lateral Rim Impingement     SIJ Compression - - Other     SIJ Distraction - - Other     POSH Test   Other     Sacral Thrust - - Other       Appt time: 8:18AM - 8:53AM    Treatment Today     TREATMENT MINUTES COMMENTS   Evaluation 15 Low Complexity Hip Evaluation   Self-care/ Home management     Manual therapy     Neuromuscular Re-education     Therapeutic Activity     Therapeutic Exercises 20 Demo/performance of HEP  Patient educated on pathology  Discussed POC   Gait training     Modality__________________                 Total 35    Blank areas are intentional and mean the treatment did not include these items.     PT Evaluation Code: (Please list factors)  Patient History/Comorbidities: ADHD  Examination: R hip  Clinical Presentation: Stable  Clinical Decision Making: Low Complexity    Patient History/  Comorbidities Examination  (body structures and functions, activity limitations, and/or participation restrictions) Clinical Presentation Clinical Decision Making (Complexity)   No documented Comorbidities or personal factors 1-2 Elements Stable and/or uncomplicated Low   1-2 documented comorbidities or personal factor 3 Elements Evolving clinical presentation with changing characteristics Moderate   3-4 documented comorbidities or personal factors 4 or more Unstable and unpredictable High                Lora Carlos, PT, DPT  2/15/2017  8:12 AM
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
## 2021-06-10 NOTE — PROGRESS NOTES
[facility] Discharge Summary  Patient Name: Bonnie Townsend  Date: 4/13/2017  Referral Diagnosis: Hip pain  Referring provider: Myron Nunez MD  Visit Diagnosis:   1. Hip pain, right     2. Status post arthroscopy of hip     3. Muscle weakness (generalized)         Goals: NOT MET  Pt. will be independent with home exercise program in : 6 weeks  Pt will: be able to drive without pain; in 12 weeks  Pt will: be able to return to walking/hiking; in 12 weeks  Pt will: be able to return to full work duties; in 12 weeks    Patient was seen for initial evaluation on 2/15/17 with two no-show appointments and did not return to physical therapy.   Therapy will be discontinued at this time.  The patient will need a new referral to resume.    Thank you for your referral.  Veronique Lion  4/13/2017  3:51 PM
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
## 2021-06-11 NOTE — PROGRESS NOTES
[facility] Daily Progress     Patient Name: Bonnie Townsend  Date: 2017  Visit #: 2  Referral Diagnosis: R posterior hip pain  Referring provider: Colette Haider CNP  Visit Diagnosis:     ICD-10-CM    1. Posterior pain of hip, right M25.551    2. Muscle weakness (generalized) M62.81          Assessment:   Patient with overall improving pain level and tolerance for movement.She is reporting great benefit from the home TENS unit and will continue to use as neccessary. She is looking to go back to work in one week.   HEP/POC compliance is  good .  Patient is benefitting from skilled physical therapy and is making steady progress toward functional goals.    Goal Status:  Pt. will be independent with home exercise program in : 4 weeks  Pt will: reduce ADALID by 30% in 8 weeks  Pt will: be bale to return to full time status at work in 8 weeks  Pt will: increase standing and walking tolerance to 20 minutes at a time in 4 weeks    Plan / Patient Education:     Continue with initial plan of care.  Progress with home program as tolerated.    Subjective:     Pain Ratin  Patient is actually doing a lot better. She has not returned to work yet because they won't let her work part time. She feels ready to work full time.  Patient noting great relief from the TENS unit and comforted by the fact she has the unit to use over her breaks at work.    Objective:     Exercises:  Exercise #1: seated piriformis stretch x 30 seconds/leg  Comment #1: supine piriformis stretch x 30 seconds  Exercise #2: Bridge x 12  Comment #2: Supien Hip ABD and ADD isometrics x 10 hold 5 seconds      Treatment Today     TREATMENT MINUTES COMMENTS   Evaluation     Self-care/ Home management     Manual therapy 12 TPR to R Piriformis with the patient  Positioned in L SL   Neuromuscular Re-education     Therapeutic Activity     Therapeutic Exercises 13    Gait training     Modality__________________                Total 25    Blank areas are  intentional and mean the treatment did not include these items.       Debbie Porter  6/9/2017

## 2021-06-12 NOTE — PROGRESS NOTES
[facility] Discharge Summary  Patient Name: Bonnie Townsend  Date: 8/25/2017  Referral Diagnosis: [unfilled]  Referring provider: Colette Haider CNP  Visit Diagnosis:   1. Posterior pain of hip, right     2. Muscle weakness (generalized)         Goals:  Pt. will be independent with home exercise program in : 4 weeks  Pt will: reduce ADALID by 30% in 8 weeks  Pt will: be bale to return to full time status at work in 8 weeks  Pt will: increase standing and walking tolerance to 20 minutes at a time in 4 weeks    Patient was seen for 2 visits from 6/2/17 to 6/9/17 with 1 missed appointments.  The patient attended therapy initially, but did not finish the therapy sessions prescribed.  Goals were not fully achieved. Explanation for goals not achieved: Patient did not return    Therapy will be discontinued at this time.  The patient will need a new referral to resume.    Thank you for your referral.  Debbie Porter  8/25/2017  9:54 AM

## 2021-06-12 NOTE — PATIENT INSTRUCTIONS - HE
The symptoms you describe suggest a viral cause, which is much more common than a bacterial cause. Antibiotics will treat bacterial infections, but have no effect on viral infections. If possible, especially if improving, start with symptom care for the first 7-10 days, then consider seeking further treatment or taking an antibiotic. Bacterial infections generally are more severe, including symptoms such as pus, fever over 101degrees F, or rapidly worsening.  If you don't see improvement after 3 days of treatment I would recommend you come in for an appointment to discuss these symptoms. You are able to schedule an appointment within [facility] at your convenience.    If you do need to come in for this same symptom within the next seven days, your eVisit will be free of charge.

## 2021-06-12 NOTE — PROGRESS NOTES
Assessment:   Diagnoses of Suspected COVID-19 virus infection, Cough, and Otalgia of both ears were pertinent to this visit.     Plan:   No medications were ordered this encounter    Hi Bonnie,    Due to your symptoms, I suggest we obtain COVID testing.  I placed the order in our  should be calling you to help schedule the appointment for the nasal swab.  Other causes of your symptoms may be due to a viral illness or allergies.  You can consider taking an over-the-counter antihistamine such as Claritin or Zyrtec to see if this will relieve pressure for your ears and assist with your cough.  You can also take over-the-counter acetaminophen for pain.  If you develop a fever your cough worsens, I suggest to schedule a virtual visit for further evaluation.    Sincerely,   Colette Haider CNP       Patient Instructions     The symptoms you describe suggest a viral cause, which is much more common than a bacterial cause. Antibiotics will treat bacterial infections, but have no effect on viral infections. If possible, especially if improving, start with symptom care for the first 7-10 days, then consider seeking further treatment or taking an antibiotic. Bacterial infections generally are more severe, including symptoms such as pus, fever over 101degrees F, or rapidly worsening.  If you don't see improvement after 3 days of treatment I would recommend you come in for an appointment to discuss these symptoms. You are able to schedule an appointment within [facility] at your convenience.    If you do need to come in for this same symptom within the next seven days, your eVisit will be free of charge.      Return for further follow up if needed. Call [phone]) or schedule an appointment via [facility]    Subjective:   Bonnie Townsend is a 39 y.o. female who submitted an eVisit request for evaluation of her Sinus Problem.  See the questionnaire and message section of encounter report for information related to history  of present illness and review of systems.    The following portions of the patient's history were reviewed and updated as appropriate:  She does not have any pertinent problems on file.  She has No Known Allergies..     Objective:   No exam performed today, patient submitted as eVisit.    Time:  6 minutes

## 2021-06-16 NOTE — PROGRESS NOTES
Bonnie Townsend is a 40 y.o. female who is being evaluated via a billable video visit.      How would you like to obtain your AVS? MyChart.  If dropped from the video visit, the video invitation should be resent by: Text to cell phone: [phone]  Will anyone else be joining your video visit? No      Video Start Time: 11:15 AM    1. Adverse effect of vaccine, initial encounter     2. Fatigue, unspecified type     3. Nausea     4. Tongue swelling       We discussed that she is likely having adverse reactions to the vaccine.  I am concerned for possible allergic reaction due to throat and tongue swelling yesterday.  I encouraged her to report this.  The throat and tongue swelling have since subsided.  I encouraged her to have Benadryl at home to take as needed if symptoms return.  We discussed that taking over-the-counter acetaminophen and ibuprofen are okay to take post vaccine, especially 3 days after.  I provided note excusing her from school yesterday.  She is content with the plan.    Subjective   Bonnie Townsend is 40 y.o. and presents today for the following health issues   HPI   Patient is concerned about potential reaction to the Pfizer vaccine.  Patient had her first Pfizer vaccine approximately 3 weeks ago.  4 days after the vaccine, she developed fatigue, muscle aches, fever, chills, and diarrhea.  Patient had her second Pfizer vaccine on 4/6/2021.  24 hours later, she developed fever, pruritic skin, lethargy, dizziness, nausea.  Yesterday morning, she felt as though her throat and tongue were swollen.  That has since subsided.  Today she has been experiencing fatigue.  She is unsure of what medication she can take for her symptoms.  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    Review of Systems  As noted above, otherwise negative.      Objective       Vitals:  No vitals were obtained today due to virtual visit.    Physical Exam   Constitutional: She appears  well-developed and well-nourished.   HENT:   Head: Normocephalic and atraumatic.   Right Ear: External ear normal.   Left Ear: External ear normal.   Nose: Nose normal.   Eyes: Conjunctivae are normal.   Neck: Normal range of motion.   Pulmonary/Chest: Effort normal. No respiratory distress.   Neurological: She is alert.   Skin: No erythema.   Psychiatric: She has a normal mood and affect. Her behavior is normal. Judgment and thought content normal.           Video-Visit Details    Type of service:  Video Visit    Video End Time (time video stopped): 11:21 AM  Originating Location (pt. Location): Home    Distant Location (provider location):  [facility]     Platform used for Video Visit: [employer] [place] [employer]

## 2021-06-16 NOTE — PROGRESS NOTES
Bonnie Townsend is a 40 y.o. female who is being evaluated via a billable telephone visit.      How would you like to obtain your AVS? MyChart.  If dropped from the video visit, the video invitation should be resent by: Text to cell phone: [phone]  Will anyone else be joining your video visit? No        1. Allergic rhinitis, unspecified seasonality, unspecified trigger  montelukast (SINGULAIR) 10 mg tablet    fluticasone propionate (FLONASE) 50 mcg/actuation nasal spray   2. ADHD, predominantly inattentive type     3. Insomnia, unspecified type       Discussed treatment options. Patient will continue Zyrtec.  In addition, will start montelukast 10 mg daily and Flonase daily.  Educated on its indications and side effects.  We discussed potential mood changes with montelukast.  She is to avoid taking the montelukast with the zolpidem.  If fever develops or symptoms worsen, may consider treatment for sinusitis.  She continues to see psychiatry for ADHD management.  She is content with the plan.    Subjective   Bonnie Townsend is 40 y.o. and presents today for the following health issues   HPI   Patient states she developed allergy symptoms 3 years ago.  Symptoms are intermittent and occur during the spring.  She has had allergy symptoms for 2 weeks.  She complains of sinus congestion with clear nasal drainage, sinus pressure, headache, fatigue, swollen eyes, bilateral ear fullness.  She denies fever, loss of sense of smell and taste, cough, postnasal drainage.  Her left eyes been itchy.  She has tried taking over-the-counter Zyrtec with minimal relief.  Patient has ADHD and insomnia.  She has sees psychiatry who prescribes methylphenidate and Vyvanse for the ADHD.  She is prescribed zolpidem to take as needed.  Patient states she rarely takes the zolpidem.  She has no concerns regarding depression today.      Review of Systems  As noted above, otherwise negative.      Objective       Vitals:  No vitals were obtained  today due to virtual visit.    Physical Exam  Patient is alert and is speaking clearly.  She does not sound short of breath or have a cough.        We attempted to have a video visit, but unfortunately we were unable to connect.  A telephone visit was performed.  Visit lasted for 7 minutes.

## 2021-06-17 NOTE — TELEPHONE ENCOUNTER
Can we try to get her set up with an e-visit with Colette or another provider today if possible? Thank you
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
## 2021-06-17 NOTE — PATIENT INSTRUCTIONS - HE
Dear Bonnie Townsend,    Your symptoms show that you may have coronavirus (COVID-19). This illness can cause fever, cough and trouble breathing. Many people get a mild case and get better on their own. Some people can get very sick.    Will I be tested for COVID-19?  We would like to test you for Covid-19 virus. I have placed orders for this test.     To schedule: go to your [employer] home page and scroll down to the section that says  You have an appointment that needs to be scheduled  and click the large green button that says  Schedule Now  and follow the steps to find the next available openings.    If you are unable to complete these [employer] scheduling steps, please call [phone] to schedule your testing.     Return to work/school/ guidance:  Please let your workplace manager and staffing office know when your quarantine ends     We can t give you an exact date as it depends on the above. You can calculate this on your own or work with your manager/staffing office to calculate this. (For example if you were exposed on 10/4, you would have to quarantine for 14 full days. That would be through 10/18. You could return on 10/19.)      If you receive a positive COVID-19 test result, follow the guidance of the those who are giving you the results. Usually the return to work is 10 (or in some cases 20 days from symptom onset.) If you work at [facility], you must also be cleared by Employee Occupational Health and Safety to return to work.        If you receive a negative COVID-19 test result and did not have a high risk exposure to someone with a known positive COVID-19 test, you can return to work once you're free of fever for 24 hours without fever-reducing medication and your symptoms are improving or resolved.      If you receive a negative COVID-19 test and If you had a high risk exposure to someone who has tested positive for COVID-19 then you can return to work 14 days after your last contact  with the positive individual    Note: If you have ongoing exposure to the covid positive person, this quarantine period may be more than 14 days. (For example, if you are continued to be exposed to your child who tested positive and cannot isolate from them, then the quarantine of 7-14 days can't start until your child is no longer contagious. This is typically 10 days from onset of the child's symptoms. So the total duration may be 17-24 days in this case.)    Sign up for [employer].   We know it's scary to hear that you might have COVID-19. We want to track your symptoms to make sure you're okay over the next 2 weeks. Please look for an email from [employer]--this is a free, online program that we'll use to keep in touch. To sign up, follow the link in the email you will receive. Learn more at [email or web address]    How can I take care of myself?    Get lots of rest. Drink extra fluids (unless a doctor has told you not to)    Take Tylenol (acetaminophen) or ibuprofen for fever or pain. If you have liver or kidney problems, ask your family doctor if it's okay to take Tylenol o ibuprofen    If you have other health problems (like cancer, heart failure, an organ transplant or severe kidney disease): Call your specialty clinic if you don't feel better in the next 2 days.    Know when to call 911. Emergency warning signs include:  o Trouble breathing or shortness of breath  o Pain or pressure in the chest that doesn't go away  o Feeling confused like you haven't felt before, or not being able to wake up  o Bluish-colored lips or face    Where can I get more information?   [facility] [place] - About COVID-19:   [email or web address]    CDC - What to Do If You're Sick:   www.cdc.gov/coronavirus/2019-ncov/about/steps-when-sick.html        April 29, 2021  RE:  Bonnie Townsend                                                                                                                  [address]  [place] [address]      To whom it may concern:    I evaluated Bonnie Townsend on 04/29/21. Bonnie Townsend should be excused from work/school.     They should let their workplace manager and staffing office know when their quarantine ends.    We can not give an exact date as it depends on the information below. They can calculate this on their own or work with their manager/staffing office to calculate this. (For example if they were exposed on 10/04, they would have to quarantine for 14 full days. That would be through 10/18. They could return on 10/19.)    Quarantine Guidelines:      If patient receives a positive COVID-19 test result, they should follow the guidance of those who are giving the results. Usually the return to work is 10 (or in some cases 20 days from symptom onset.) If they work at [employer], they must be cleared by Employee Occupational Health and Safety to return to work.        If patient receives a negative COVID-19 test result and did not have a high risk exposure to someone with a known positive COVID-19 test, they can return to work once they're free of fever for 24 hours without fever-reducing medication and their symptoms are improving or resolved.      If patient receives a negative COVID-19 test and if they had a high risk exposure to someone who has tested positive for COVID-19 then they can return to work 14 days after their last contact with the positive individual    Note: If there is ongoing exposure to the covid positive person, this quarantine period may be longer than 14 days. (For example, if they are continually exposed to their child, who tested positive and cannot isolate from them, then the quarantine of 7-14 days can't start until their child is no longer contagious. This is typically 10 days from onset to the child's symptoms. So the total duration may be 17-24 days in this case.)    Sincerely,  Libra Gibson MD

## 2021-06-17 NOTE — PROGRESS NOTES
Assessment and Plan:     1. Neck pain  HM2(CBC w/o Differential)   2. Acute nonintractable headache, unspecified headache type     3. Fatigue, unspecified type     4. Non-intractable vomiting with nausea, unspecified vomiting type       Patient's vomiting has resolved.  Suspect viral gastroenteritis.  She continues to experience a mild headache and neck pain.  There is no nuchal rigidity.  Suspect dehydration or muscular strain.  Discussed symptomatic treatment and the importance of rehydration.  I encouraged adequate fluid intake.  Will check hemogram.  If white blood count is elevated, may consider ER referral due to neck pain and concerns for meningitis.  She is content with the plan.    Subjective:     Bonnie is a 37 y.o. female presenting to the clinic for concerns for vomiting.  Patient states Sunday, she vomited intermittently for most of the night.  She states she slept for most of the day Sunday.  She did not develop diarrhea.  Since then, she has felt fatigued and has had a constant dull ache within her neck and occipital region.  She has had less of an appetite.  She denies blurry vision, numbness and tingling of her extremities, muscular weakness, abdominal pain.  Bowel movements are normal without blood or mucus.  She has not had any recent cold symptoms or sore throat.  She felt feverish at the onset of symptoms but did not take her temperature.  She has not had a fever since.  She has been taking over-the-counter Advil.  No one else around her has been ill.    Review of Systems: A complete 14 point review of systems was obtained and is negative or as stated in the history of present illness.    Social History     Social History     Marital status:      Spouse name: N/A     Number of children: N/A     Years of education: N/A     Occupational History     Not on file.     Social History Main Topics     Smoking status: Former Smoker     Smokeless tobacco: Never Used      Comment: Social      Alcohol  "use 1.2 - 1.8 oz/week     2 - 3 Glasses of wine per week     Drug use: No     Sexual activity: Yes     Partners: Male     Birth control/ protection: IUD     Other Topics Concern     Not on file     Social History Narrative       Active Ambulatory Problems     Diagnosis Date Noted     ADHD, predominantly inattentive type 10/20/2016     Right hip pain 10/20/2016     Insomnia 10/20/2016     IUD contraception 10/20/2016     Tobacco use disorder 10/20/2016     Resolved Ambulatory Problems     Diagnosis Date Noted     Red Eyes      Acute Otitis Media      Acute Sinusitis      Past Medical History:   Diagnosis Date     ADHD (attention deficit hyperactivity disorder)      Hip pain      PONV (postoperative nausea and vomiting)        No family history on file.    Objective:     /70  Pulse 86  Temp 97.9  F (36.6  C)  Ht 5' 2\" (1.575 m)  Wt 130 lb 12.8 oz (59.3 kg)  BMI 23.92 kg/m2    Patient is alert, in no obvious distress.   Skin: Warm, dry.  No lesions or rashes.  Skin turgor rapid return.   HEENT:  Head normocephalic, atraumatic.  Eyes normal.  Ears normal.  Nose patent, mucosa pink.  Oropharynx mucosa pink.  No lesions or tonsillar enlargement.   Neck: Supple, no lymphadenopathy.  Lungs:  Clear to auscultation. Respirations even and unlabored.  No wheezing or rales noted.   Heart:  Regular rate and rhythm.  No murmurs.   Abdomen: Soft, nontender.  No organomegaly. Bowel sounds normoactive. No guarding or masses noted.                 "

## 2021-06-18 NOTE — PROGRESS NOTES
Chief complaint: Right upper quadrant abdominal pain    HPI: The patient has a one-week history of right upper quadrant abdominal pain that wraps around to the back.  She does not describe it as burning its kind of a constant ache that is dramatically worse after she eats.  There is a positive family history of gallbladder disease in her grandmother.    She was seen at the [facility] clinic over the weekend and they did an ultrasound which showed no dilated bile ducts and no gallstones.  Her story and her exam is fairly classic for gallbladder disease so we discussed doing a nuclear medicine test to assess gallbladder function    Objective:/78 (Patient Site: Right Arm, Patient Position: Sitting, Cuff Size: Adult Regular)  Pulse 68  Wt 129 lb 3.2 oz (58.6 kg)  Breastfeeding? No  BMI 23.63 kg/m2  She is in no acute distress.  Her skin is clear her conjunctiva are clear nonicteric she is wearing glasses.    I personally reviewed the notes from the [facility] that had the radiology report attached    She has bowel sounds positive in all 4 quadrants but she has been eating very bland foods over the weekend as fatty or spicy foods make this worse.  She has a little bit of an ache deep in her right upper quadrant but she has not eaten recently.  I am not appreciating guarding or rebound as was assessed on the weekend.  She is afebrile.    She had liver functions, accomplished about 2 months ago because she was having some abdominal bloating so she did not feel the need for me to repeat that today    Assessment: Right upper quadrant abdominal pain without evidence of gallstones.  Still suspicious for gallbladder dysfunction    Plan: I will do a gallbladder function test if that is abnormal do a referral to a general surgeon for probable cholecystectomy.  If the function scan is negative, I still likely would refer her either to gastroenterology or more likely to surgery to discuss her symptoms and  her imaging.

## 2021-06-18 NOTE — PROGRESS NOTES
"Subjective:      Patient ID: Bonnie Townsend is a 37 y.o. female.    Chief Complaint:   Chief Complaint   Patient presents with     Right shoulder and rib  pain - started last week and worse         HPI: Woke up with pain in area of right anterior ribcage wrapping around to back about 1 week ago. No trauma. No shortness of breath, no cough, no fever.  Feeling bloated.  No vomiting. +Heartburn today and yesterday. Worse with eating, approx 30 mins afterward.  Doesn't particularly feel it in her abdomen. Grandma had gallbladder out at age 25.  Pain with movement a certain way or deep breathing.      EtOH 1x per week, 1 glass per week.      Kids have URIs, no strep.        Past Medical History:   Diagnosis Date     ADHD (attention deficit hyperactivity disorder)      Hip pain     right     PONV (postoperative nausea and vomiting)     Nausea       Past Surgical History:   Procedure Laterality Date     APPENDECTOMY        SECTION       HIP ARTHROSCOPY Right 10/25/2016    Procedure: RIGHT HIP ARTHROSCOPY PINCER RESECTION LABRAL REPAIR CAPSULAR CLOSURE;  Surgeon: Myron Nunez MD;  Location: [facility];  Service:      LAPAROSCOPY FOR ECTOPIC PREGNANCY         No family history on file.    Social History   Substance Use Topics     Smoking status: Former Smoker     Smokeless tobacco: Never Used      Comment: Social      Alcohol use 1.2 - 1.8 oz/week     2 - 3 Glasses of wine per week       Review of Systems   Constitutional: Negative for appetite change, chills, fatigue, fever and unexpected weight change.   Respiratory: Negative for cough and shortness of breath.    Cardiovascular: Negative for chest pain.   Gastrointestinal: Positive for abdominal distention (\"bloating\") and nausea. Negative for blood in stool, diarrhea and vomiting.   Genitourinary: Negative for difficulty urinating, dysuria and flank pain.   Skin: Negative for rash.       Objective:     /78 (Patient Site: Right Arm, Patient Position: " Sitting)  Pulse 93  Temp 99.1  F (37.3  C) (Oral)   Resp 16  Wt 130 lb 11.2 oz (59.3 kg)  SpO2 99%  Breastfeeding? No  BMI 23.91 kg/m2    Physical Exam   Constitutional: She is oriented to person, place, and time. She appears well-developed and well-nourished.   HENT:   Mouth/Throat: Oropharynx is clear and moist.   Cardiovascular: Normal rate, regular rhythm, normal heart sounds and intact distal pulses.    No murmur heard.  Pulmonary/Chest: Effort normal and breath sounds normal. No respiratory distress. She has no wheezes. She has no rales. She exhibits no tenderness.   Abdominal: She exhibits no distension. There is tenderness (RUQ - + Fulton's sign.  No hepatomegaly.  ). There is guarding (RUQ).   Musculoskeletal: Normal range of motion. She exhibits no tenderness.   Nml UE ROM.  Unable to reproduce pain with rib cage palpation.    Neurological: She is alert and oriented to person, place, and time.   Skin: No rash noted.       Assessment:     Procedures    The primary encounter diagnosis was RUQ abdominal pain. A diagnosis of Heartburn was also pertinent to this visit.     Us Abdomen Limited    Result Date: 6/8/2018  EXAM DATE:         06/08/2018 [facility] US ABDOMEN LIMITED 6/8/2018 2:45 PM INDICATION: RUQ abdomen pain, worse with eating. Assess gallbladder. COMPARISON: None. FINDINGS: GALLBLADDER: Gallbladder is not distended, likely related to recent meal. Negative sonographic Fulton's sign. No large gallstones identified. BILE DUCTS: No bile duct dilation. The common bile duct measures 3 mm. LIVER: Normal where seen. RIGHT KIDNEY: No hydronephrosis. PANCREAS: Not imaged in detail on this limited study. No incidental abnormalities. No ascites in the right upper quadrant. CONCLUSION: 1.  Nondistended gallbladder, likely related to recent meal. 2.  Otherwise normal RUQ ultrasound.       Plan:     Patient with no hepatomegaly; positive Fulton sign, increase in pain with eating that  wraps around to rt back in classic manner.  Highly suspect patient has gallstones that are causing  intermittent pain with eating.  Do not suspect an acute infectious process today given lack of fever and intermittent nature of symptoms.      No tenderness to rib cage in area of pain makes me not suspect musculoskeletal issues.  Considered PE, but patient is on progesterone only birth control, has no cough, no shortness of breath, and normal oxygen saturations and heart rate under 100.  GERD might be causing sx although would be unusual to have pain wrap around flank.  Alcoholic pancreatitis unlikely.      Offered additional screening labs for liver/pancreas/infection and clots; patient says she will see how she does over the weekend and follow-up with her primary care provider.      Reduce fatty foods over the weekend to see if makes a difference in pain as discussed.      Return or go to the ER for constant pain fever or significant vomiting.    Take over-the-counter Pepcid 1 pill twice a day for 14 days for heartburn.     Tylenol for discomfort as needed per package instructions.    Follow-up early next week if still having pain.    At the end of the encounter, I discussed results, diagnosis, medications. Discussed red flags for immediate return to clinic/ER, as well as indications for follow up if no improvement. Patient and/or caregiver  understood and agreed to plan. Patient was stable for discharge.

## 2021-06-18 NOTE — PROGRESS NOTES
Assessment and Plan:     1. Rash  2. Abdominal pain, right upper quadrant  3. Acute right-sided thoracic back pain  Differentials include herpes zoster, contact dermatitis, allergic reaction.  Due to onset of pain prior to rash, will treat for possible shingles with valacyclovir.  Educated on its indications and side effects.  She will use triamcinolone cream as needed for irritation.  She declines pain medication today.  Will notify patient of culture results per she is content with the plan.  - Herpes Simplex PCR (not CSF)  - valACYclovir (VALTREX) 1000 MG tablet; Take 1 tablet (1,000 mg total) by mouth 3 (three) times a day for 7 days.  Dispense: 21 tablet; Refill: 0  - triamcinolone (KENALOG) 0.1 % cream; Apply to the affected area twice daily as needed.  No longer than 2 weeks.  Dispense: 45 g; Refill: 1    Subjective:     Bonnie is a 37 y.o. female presenting to the clinic for concerns for right rib pain for 1 and half weeks.  She states the pain wrapped around to her back. She presented to urgent care on Friday where she had a normal ultrasound.  It was recommended for her to try Pepcid twice daily.  Patient states her pain worsened on Monday so she presented to the [facility] where a HIDA scan was ordered.  HIDA scan shows patent cystic and common ducts.  There is normal gallbladder contractility.  Patient states the pain worsened last night so she looked and noticed a rash within her right upper abdomen.  Patient states the rash has now spread to her left side.  She has an itching and burning sensation from her right mid back to her right upper abdomen.  She felt as though she had a low-grade fever on Friday but none since.  She had chills last night.  She denies nausea, vomiting, constipation, diarrhea.  She has tried Pepcid with no relief.  She has a history of chickenpox in childhood.    Review of Systems: A complete 14 point review of systems was obtained and is negative or as stated in the history of  "present illness.    Social History     Social History     Marital status:      Spouse name: N/A     Number of children: N/A     Years of education: N/A     Occupational History     Not on file.     Social History Main Topics     Smoking status: Former Smoker     Smokeless tobacco: Never Used      Comment: Social      Alcohol use 1.2 - 1.8 oz/week     2 - 3 Glasses of wine per week     Drug use: No     Sexual activity: Yes     Partners: Male     Birth control/ protection: IUD     Other Topics Concern     Not on file     Social History Narrative       Active Ambulatory Problems     Diagnosis Date Noted     ADHD, predominantly inattentive type 10/20/2016     Right hip pain 10/20/2016     Insomnia 10/20/2016     IUD contraception 10/20/2016     Tobacco use disorder 10/20/2016     Resolved Ambulatory Problems     Diagnosis Date Noted     Red Eyes      Acute Otitis Media      Acute Sinusitis      Past Medical History:   Diagnosis Date     ADHD (attention deficit hyperactivity disorder)      Hip pain      PONV (postoperative nausea and vomiting)        No family history on file.    Objective:     /62  Pulse 76  Ht 5' 2\" (1.575 m)  Wt 128 lb 9.6 oz (58.3 kg)  BMI 23.52 kg/m2    Patient is alert, in no obvious distress.   Skin: Warm, dry.  She has a slightly raised small cluster of vesicles present within the right upper abdomen and another area in the left upper abdomen.  No rash is present on her back.   Lungs:  Clear to auscultation. Respirations even and unlabored.  No wheezing or rales noted.   Heart:  Regular rate and rhythm.  No murmurs.   Abdomen: Soft, nontender.  No organomegaly. Bowel sounds normoactive. No guarding or masses noted.                "

## 2021-06-19 NOTE — LETTER
Letter by Colette Haider CNP at      Author: Colette Haider CNP Service: -- Author Type: --    Filed:  Encounter Date: 9/5/2019 Status: (Other)         Bonnie Townsend  [address] [place] [address]      September 5, 2019      Dear Bonnie    In reviewing your records, we have determined a gap in your preventive services. Based on your age and health history, we recommend the follow service.     ? Physical with a Pap Smear      If you have had the service elsewhere, please contact us so we can update our records. Please let us know if you have transferred your care to another clinic.    Please call [phone] to schedule this appointment.    We believe that a strong preventive care program, including regular physicals and follow-up care is an important part of a healthy lifestyle and we are committed to helping you maintain your health.    Thank you for choosing us as your health care provider.    Sincerely,     [facility]

## 2021-06-20 ENCOUNTER — HEALTH MAINTENANCE LETTER (OUTPATIENT)
Age: 41
End: 2021-06-20

## 2021-06-21 NOTE — LETTER
Letter by Colette Hadier CNP at      Author: Colette Haider CNP Service: -- Author Type: --    Filed:  Encounter Date: 4/9/2021 Status: (Other)         April 9, 2021     Patient: Bonnie Townsend   YOB: 1980   Date of Visit: 4/9/2021       To Whom it May Concern:    Bonnie Townsend was seen virtually on 4/9/2021. Please excuse patient from school on 4/8/21.      If you have any questions or concerns, please don't hesitate to call.    Sincerely,         Electronically signed by Colette Haider CNP

## 2021-06-22 NOTE — TELEPHONE ENCOUNTER
Refill Approved    Rx renewed per Medication Renewal Policy. Medication was last renewed on 12/7/18.    Ov: 12/7/18    Monae Mares, [facility] Triage/Med Refill 1/3/2019     Requested Prescriptions   Pending Prescriptions Disp Refills     sertraline (ZOLOFT) 50 MG tablet [Pharmacy Med Name: SERTRALINE HCL 50 MG TABLET] 30 tablet 0     Sig: TAKE 1 TABLET BY MOUTH EVERY DAY    [facility] Refill Protocol  Passed - 1/2/2019  6:54 PM       Passed - PCP or prescribing provider visit in last year    Last office visit with prescriber/PCP: 12/7/2018 Colette Haider CNP OR same dept: 12/7/2018 Colette Haider CNP OR same specialty: 12/7/2018 Colette Haider CNP  Last physical: Visit date not found Last MTM visit: Visit date not found   Next visit within 3 mo: Visit date not found  Next physical within 3 mo: Visit date not found  Prescriber OR PCP: Colette Haider CNP  Last diagnosis associated with med order: 1. Moderate episode of recurrent major depressive disorder (H)  - sertraline (ZOLOFT) 50 MG tablet [Pharmacy Med Name: SERTRALINE HCL 50 MG TABLET]; TAKE 1 TABLET BY MOUTH EVERY DAY  Dispense: 30 tablet; Refill: 0    If protocol passes may refill for 12 months if within 3 months of last provider visit (or a total of 15 months).

## 2021-06-22 NOTE — PROGRESS NOTES
Assessment and Plan:     1. Moderate episode of recurrent major depressive disorder (H)  sertraline (ZOLOFT) 50 MG tablet   2. ADHD, predominantly inattentive type       Will increase Sertraline to 50 mg daily.  Educated on its indications and side effects. Obtained phq9 score of 9.  She denies thoughts of suicide. I encouraged counseling.  I recommend follow-up in one month for medication management or sooner with any futher concerns. She continues to see psychiatry for ADD management.  She is taking Methylphenidate.  She is content with the plan.     Subjective:     Bonnie is a 38 y.o. female presenting to the clinic for concerns for depression.  Patient has a history of depression in the past.  She took sertraline after sustaining a concussion 2 years ago.  Patient has noticed over the past month, she has felt less energized and has been sleeping more.  She will nap during the day and be in bed by 7 PM.  She denies thoughts of suicide.  She does not have any added stressors currently.  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  She feels as though she has a good support system.  Patient has found that she is eating more in the morning.  She has some leftover sertraline and has been taking 25 mg for 3 weeks.  She is interested in increasing the dose of the sertraline.     Review of Systems: A complete 14 point review of systems was obtained and is negative or as stated in the history of present illness.    Social History     Socioeconomic History     Marital status:      Spouse name: Not on file     Number of children: Not on file     Years of education: Not on file     Highest education level: Not on file   Social Needs     Financial resource strain: Not on file     Food insecurity - worry: Not on file     Food insecurity - inability: Not on file     Transportation needs - medical: Not on file     Transportation needs - non-medical: Not on file   Occupational History     Not on file   Tobacco Use      "Smoking status: Former Smoker     Smokeless tobacco: Never Used     Tobacco comment: Social    Substance and Sexual Activity     Alcohol use: Yes     Alcohol/week: 1.2 - 1.8 oz     Types: 2 - 3 Glasses of wine per week     Drug use: No     Sexual activity: Yes     Partners: Male     Birth control/protection: IUD   Other Topics Concern     Not on file   Social History Narrative     Not on file       Active Ambulatory Problems     Diagnosis Date Noted     ADHD, predominantly inattentive type 10/20/2016     Right hip pain 10/20/2016     Insomnia 10/20/2016     IUD contraception 10/20/2016     Tobacco use disorder 10/20/2016     Resolved Ambulatory Problems     Diagnosis Date Noted     Red Eyes      Acute Otitis Media      Acute Sinusitis      Past Medical History:   Diagnosis Date     ADHD (attention deficit hyperactivity disorder)      Hip pain      PONV (postoperative nausea and vomiting)        No family history on file.    Objective:     /70   Pulse 98   Ht 5' 2\" (1.575 m)   Wt 133 lb (60.3 kg)   BMI 24.33 kg/m      Patient is alert, in no obvious distress.   Skin: Warm, dry.  Neck: Supple, no lymphadenopathy. No thyromegaly.  Lungs:  Clear to auscultation. Respirations even and unlabored.  No wheezing or rales noted.   Heart:  Regular rate and rhythm.  No murmurs, S3, S4, gallops, or rubs.    Abdomen: Soft, nontender.  No organomegaly. Bowel sounds normoactive. No guarding or masses noted.               "

## 2021-07-23 ENCOUNTER — E-VISIT (OUTPATIENT)
Dept: FAMILY MEDICINE | Facility: CLINIC | Age: 41
End: 2021-07-23
Payer: COMMERCIAL

## 2021-07-23 DIAGNOSIS — R21 RASH: Primary | ICD-10-CM

## 2021-07-23 PROCEDURE — 99207 PR NON-BILLABLE SERV PER CHARTING: CPT | Performed by: NURSE PRACTITIONER

## 2021-07-27 NOTE — TELEPHONE ENCOUNTER
Provider E-Visit time total (minutes): 4        Assessment:   The primary encounter diagnosis was (R21) Rash  (primary encounter diagnosis)  **      Plan:   No orders of the defined types were placed in this encounter.    I am so sorry that I didn't respond sooner as I was out of the clinic for multiple days.  I am unsure if you still have the rash.  I am happy to see you in person or virtually if you still have the rash.  Otherwise, urgent care would also be a great way to evaluate this further.  I hope you are feeling better!     Patient Instructions     Dear Bonnie Townsend,    We are sorry you are not feeling well. Based on the responses you provided, it is recommended that you be seen in-person in urgent care so we can better evaluate your symptoms. Please click here to find the nearest urgent care location to you.   You will not be charged for this Visit. Thank you for trusting us with your care.    Colette Haider, SWEETIE CNP        Subjective:   Bonnie is a 40 year old individual who submitted an eVisit request for evaluation of   Chief Complaint   Patient presents with     Allergic Rhinitis     Entered automatically based on patient selection in [employer].       See the questionnaire and message section of encounter report for information related to history of present illness and review of systems.    Portions of the patient's history were reviewed and updated as appropriate.     Objective:   No exam performed today, patient submitted as eVisit.

## 2021-07-27 NOTE — PATIENT INSTRUCTIONS
Dear Bonnie Townsend,    We are sorry you are not feeling well. Based on the responses you provided, it is recommended that you be seen in-person in urgent care so we can better evaluate your symptoms. Please click here to find the nearest urgent care location to you.   You will not be charged for this Visit. Thank you for trusting us with your care.    SWEETIE Pepe CNP

## 2021-08-02 ENCOUNTER — APPOINTMENT (OUTPATIENT)
Dept: RADIOLOGY | Facility: CLINIC | Age: 41
End: 2021-08-02
Attending: EMERGENCY MEDICINE
Payer: COMMERCIAL

## 2021-08-02 ENCOUNTER — HOSPITAL ENCOUNTER (EMERGENCY)
Facility: CLINIC | Age: 41
Discharge: HOME | End: 2021-08-02
Admitting: EMERGENCY MEDICINE
Payer: COMMERCIAL

## 2021-08-02 VITALS
DIASTOLIC BLOOD PRESSURE: 82 MMHG | TEMPERATURE: 97.4 F | RESPIRATION RATE: 18 BRPM | OXYGEN SATURATION: 99 % | SYSTOLIC BLOOD PRESSURE: 133 MMHG | HEART RATE: 91 BPM

## 2021-08-02 DIAGNOSIS — S93.401A SPRAIN OF RIGHT ANKLE, UNSPECIFIED LIGAMENT, INITIAL ENCOUNTER: ICD-10-CM

## 2021-08-02 PROCEDURE — 99284 EMERGENCY DEPT VISIT MOD MDM: CPT

## 2021-08-02 PROCEDURE — 73630 X-RAY EXAM OF FOOT: CPT | Mod: RT

## 2021-08-02 PROCEDURE — 73610 X-RAY EXAM OF ANKLE: CPT | Mod: RT

## 2021-08-02 NOTE — LETTER
[facility] Room  [address] [facility] [address] [facility] [address]  Phone: [phone]  Fax: [phone]   August 2, 2021    Patient: Bonnie Townsend   YOB: 1980   Date of Visit: 8/2/2021       To Whom It May Concern:    Bonnie Townsend was present in our emergency department on 8/2/2021.  Please excuse from school / work on 8/2/2021 due to injury.    Sincerely,     [facility]

## 2021-08-02 NOTE — DISCHARGE INSTRUCTIONS
*SPRAIN:ANKLE [w/ x-ray]    A sprain is an injury to the ligaments or capsule that holds a joint together. There are no broken bones. Most sprains take from four to six weeks to heal. If the ligament is completely torn (severe sprain), it can take several months to recover.  Mild to Moderate sprains may be treated with an elastic wrap or an in-shoe splint to provide support and prevent re-injury. A mild sprain may not require any additional support. A severe sprain may require surgery to repair, but this is rare.  HOME CARE:  Stay off the injured leg as much as possible until you can walk on it without pain. If you have a lot of pain with walking, crutches or a walker may be prescribed. (These can be rented or purchased at many pharmacies and surgical or orthopedic supply stores). Follow your doctor's advice regarding when to begin bearing weight on that leg.  Keep your leg elevated to reduce pain and swelling. When sleeping, place a pillow under the injured leg. When sitting, support the injured leg so it is level with your waist. This is very important during the first 48 hours.  Apply an ice pack (ice cubes in a plastic bag, wrapped in a towel) over the injured area for 20 minutes every 1-2 hours the first day. You can place the ice pack directly over the splint/cast. If you were given a boot, open it to apply the ice pack. Continue with ice packs 3-4 times a day for the next two days, then as needed for the relief of pain and swelling.  You may use acetaminophen (Tylenol) 650-1000 mg every 6 hours or ibuprofen (Motrin, Advil) 600 mg every 6-8 hours with food to control pain, if you are able to take these medicines. [NOTE: If you have chronic liver or kidney disease or ever had a stomach ulcer or GI bleeding, talk with your doctor before using these medicines.]  You may return to sports after healing, when you can run without pain.  A sprained ankle is at risk for re-injury during the first six weeks. During  that time, protect your ankle with an in-shoe splint that prevents tilting of your ankle from side to side. This is very important if you do active work or play sports during that time.  FOLLOW UP with your doctor, or as advised, if you are not starting to improve within the next five days.   GET PROMPT MEDICAL ATTENTION if any of the following occur:  The plaster cast or splint gets wet or soft  The fiberglass cast or splint gets wet and does not dry for 24 hours  Pain or swelling increases, or redness appears  Toes become cold, blue, numb or tingly    3292-1973 [employer], [address] [place] [address], [place] [address]. All rights reserved. This information is not intended as a substitute for professional medical care. Always follow your healthcare professional's instructions.

## 2021-08-02 NOTE — ED PROVIDER NOTES
EMERGENCY DEPARTMENT ENCOUNTER      NAME: Bonnie Townsend  AGE: 40 year old female  YOB: 1980  MRN: [id number]  EVALUATION DATE & TIME: 2021  3:42 PM    PCP: Colette Haider    ED PROVIDER: SWEETIE Johnston, CNP      Chief Complaint   Patient presents with     Ankle Pain         FINAL IMPRESSION:  1. Sprain of right ankle, unspecified ligament, initial encounter          ED COURSE & MEDICAL DECISION MAKIN:02 PM I met with the patient, obtained history, performed an initial exam, and discussed options and plan for treatment here in the ED. I saw the patient wearing a surgical mask, face shield, and gloves.   4:04 PM I updated the patient on imaging results. We discussed plans for discharge including supportive cares, symptomatic treatment, outpatient follow up, and reasons to return to the emergency department.    Pertinent Labs & Imaging studies reviewed. (See chart for details)  40 year old female presents to the Emergency Department for evaluation of right ankle injury.  Exam would seem most consistent with sprain.  X-ray not showing any obvious fracture.  Recommend that she continue with her ankle brace and crutches.  Offered crutches here.  She states she has some at home.  Given additional instructions regarding management of soft tissue injuries.  Recommend clinic follow-up in 7 to 10 days for recheck.  Given return precautions    At the conclusion of the encounter I discussed the results of all of the tests and the disposition. The questions were answered. The patient or family acknowledged understanding and was agreeable with the care plan.     MEDICATIONS GIVEN IN THE EMERGENCY:  Medications - No data to display    NEW PRESCRIPTIONS STARTED AT TODAY'S ER VISIT  New Prescriptions    No medications on file            =================================================================    HPI    Patient information was obtained from: Patient    Use of Intrepreter: N/A       Bonnie ASENCIO  Kristian is a 40 year old female with a history of tobacco use disorder who presents via walk in for evaluation of ankle pain.    Pain presents with complaints of right ankle pain after rolling her ankle while stepping off of a step yesterday. Pain is located in the lateral portion of her right ankle and radiates up her lower right leg when she bears weight. She has been wearing a splint and icing at home. Patient states she still experiencing pain when she bears weight with the splint on. She reports a history of overuse injury to the right ankle after a hiking trip 2 years ago, but states her current pain does not feel similar.       REVIEW OF SYSTEMS   ROS is limited  Review of Systems   Musculoskeletal:        Endorses right ankle pain (lateral, radiates up right lower leg)   All other systems reviewed and are negative.      PAST MEDICAL HISTORY:  Past Medical History:   Diagnosis Date     ADHD (attention deficit hyperactivity disorder)      Hip pain     right     PONV (postoperative nausea and vomiting)     Nausea       PAST SURGICAL HISTORY:  Past Surgical History:   Procedure Laterality Date     APPENDECTOMY       ARTHROSCOPY HIP Right 10/25/2016    Procedure: RIGHT HIP ARTHROSCOPY PINCER RESECTION LABRAL REPAIR CAPSULAR CLOSURE;  Surgeon: Myron Nunez MD;  Location: [facility];  Service:       SECTION       LAPAROSCOPY FOR ECTOPIC PREGNANCY             CURRENT MEDICATIONS:    Prior to Admission Medications   Prescriptions Last Dose Informant Patient Reported? Taking?   VYVANSE 50 mg capsule   Yes No   Sig: [VYVANSE 50 MG CAPSULE] Take 50 mg by mouth daily.   levonorgestrel (MIRENA) 20 mcg/24 hr (5 years) IUD   Yes No   Sig: [LEVONORGESTREL (MIRENA) 20 MCG/24 HR (5 YEARS) IUD] 1 each by Intrauterine route once.   methylphenidate HCl (RITALIN) 10 MG tablet   Yes No   Sig: [METHYLPHENIDATE HCL (RITALIN) 10 MG TABLET] Take 10 mg by mouth as needed.   montelukast (SINGULAIR) 10 mg tablet   No No    Sig: [MONTELUKAST (SINGULAIR) 10 MG TABLET] Take 1 tablet (10 mg total) by mouth daily.   omeprazole (PRILOSEC) 20 MG capsule   No No   Sig: [OMEPRAZOLE (PRILOSEC) 20 MG CAPSULE] Take 1 capsule (20 mg total) by mouth daily before breakfast.   ondansetron (ZOFRAN-ODT) 4 MG disintegrating tablet   No No   Sig: [ONDANSETRON (ZOFRAN-ODT) 4 MG DISINTEGRATING TABLET] Take 1 tablet (4 mg total) by mouth every 8 (eight) hours as needed for nausea.   oxyCODONE (ROXICODONE) 5 MG immediate release tablet   No No   Sig: [OXYCODONE (ROXICODONE) 5 MG IMMEDIATE RELEASE TABLET] Take 1 tablet (5 mg total) by mouth every 6 (six) hours as needed for pain.   zolpidem (AMBIEN) 10 mg tablet   Yes No   Sig: [ZOLPIDEM (AMBIEN) 10 MG TABLET] Take 10 mg by mouth at bedtime as needed.      Facility-Administered Medications: None           ALLERGIES:  Allergies   Allergen Reactions     Pollen [Pollen Extract] Dizziness, Headache, Muscle Pain (Myalgia), Other (See Comments) and Tinnitus     Sulfa (Sulfonamide Antibiotics) [Sulfa Drugs] Unknown       FAMILY HISTORY:  Family History   Problem Relation Age of Onset     Hypertension Mother      Lung Cancer Father        SOCIAL HISTORY:   Social History     Socioeconomic History     Marital status: Patient Declined     Spouse name: Not on file     Number of children: Not on file     Years of education: Not on file     Highest education level: Not on file   Occupational History     Not on file   Tobacco Use     Smoking status: Former Smoker     Smokeless tobacco: Never Used     Tobacco comment: Social   Substance and Sexual Activity     Alcohol use: Yes     Alcohol/week: 2.0 - 3.0 standard drinks     Drug use: No     Sexual activity: Yes     Partners: Male     Birth control/protection: I.U.D.   Other Topics Concern     Not on file   Social History Narrative     Not on file     Social Determinants of Health     Financial Resource Strain:      Difficulty of Paying Living Expenses:    Food Insecurity:       Worried About Running Out of Food in the Last Year:      Ran Out of Food in the Last Year:    Transportation Needs:      Lack of Transportation (Medical):      Lack of Transportation (Non-Medical):    Physical Activity:      Days of Exercise per Week:      Minutes of Exercise per Session:    Stress:      Feeling of Stress :    Social Connections:      Frequency of Communication with Friends and Family:      Frequency of Social Gatherings with Friends and Family:      Attends [employer] Services:      Active Member of Clubs or Organizations:      Attends Club or Organization Meetings:      Marital Status:    Intimate Partner Violence:      Fear of Current or Ex-Partner:      Emotionally Abused:      Physically Abused:      Sexually Abused:          VITALS:  Patient Vitals for the past 24 hrs:   BP Temp Temp src Pulse Resp SpO2   08/02/21 1450 133/82 97.4  F (36.3  C) Temporal 91 18 99 %       PHYSICAL EXAM    Constitutional:  Well developed, well nourished, no distress  HENT:  Atraumatic  Respiratory:  Normal, nonlabored respirations  Musculoskeletal: Tenderness overlying the lateral malleolus right ankle.  Increases with ankle eversion.  Normal plantar and dorsiflexion of the right foot.  Minimal tenderness over the base of the right fifth metatarsal.  No medial or posterior right ankle tenderness.  No tenderness over the right fibular head.  Integument:  Well hydrated, intact at the site of injury  Neurologic: Oriented x3       LAB:  All pertinent labs reviewed and interpreted.  Results for orders placed or performed during the hospital encounter of 08/02/21   Ankle XR, G/E 3 views, right    Impression    IMPRESSION: Normal joint spaces and alignment. No acute fracture of the foot or ankle. Ankle mortise is intact. Small cortical ossific fragment along dorsal aspect of the talonavicular joint is chronic.   Foot  XR, G/E 3 views, right    Impression    IMPRESSION: Normal joint spaces and alignment. No acute  fracture of the foot or ankle. Ankle mortise is intact. Small cortical ossific fragment along dorsal aspect of the talonavicular joint is chronic.       RADIOLOGY:  Reviewed all pertinent imaging. Please see official radiology report.  Foot  XR, G/E 3 views, right   Final Result   IMPRESSION: Normal joint spaces and alignment. No acute fracture of the foot or ankle. Ankle mortise is intact. Small cortical ossific fragment along dorsal aspect of the talonavicular joint is chronic.      Ankle XR, G/E 3 views, right   Final Result   IMPRESSION: Normal joint spaces and alignment. No acute fracture of the foot or ankle. Ankle mortise is intact. Small cortical ossific fragment along dorsal aspect of the talonavicular joint is chronic.            PROCEDURES:   None      I, Bethany Castillo, am serving as a scribe to document services personally performed by Jose Weiss CNP. based on my observation and the provider's statements to me. IJose CNP attest that Bethany Castillo is acting in a scribe capacity, has observed my performance of the services and has documented them in accordance with my direction.    SWEETIE Johnston, CNP  Emergency Medicine  [facility] EMERGENCY ROOM  [address] [id number] [phone]  Dept: [phone]           Jose Weiss APRN CNP  08/02/21 9510

## 2021-09-02 DIAGNOSIS — R22.0 TONGUE SWELLING: Primary | ICD-10-CM

## 2021-09-02 DIAGNOSIS — R21 RASH: ICD-10-CM

## 2021-09-02 DIAGNOSIS — J30.9 ALLERGIC RHINITIS, UNSPECIFIED SEASONALITY, UNSPECIFIED TRIGGER: ICD-10-CM

## 2021-09-03 ENCOUNTER — LAB (OUTPATIENT)
Dept: LAB | Facility: CLINIC | Age: 41
End: 2021-09-03
Payer: COMMERCIAL

## 2021-09-03 DIAGNOSIS — J30.9 ALLERGIC RHINITIS, UNSPECIFIED SEASONALITY, UNSPECIFIED TRIGGER: ICD-10-CM

## 2021-09-03 DIAGNOSIS — R22.0 TONGUE SWELLING: ICD-10-CM

## 2021-09-03 DIAGNOSIS — R21 RASH: ICD-10-CM

## 2021-09-03 PROCEDURE — 86003 ALLG SPEC IGE CRUDE XTRC EA: CPT | Mod: 59

## 2021-09-03 PROCEDURE — 86003 ALLG SPEC IGE CRUDE XTRC EA: CPT | Mod: 59 | Performed by: NURSE PRACTITIONER

## 2021-09-03 PROCEDURE — 36415 COLL VENOUS BLD VENIPUNCTURE: CPT

## 2021-09-03 PROCEDURE — 86003 ALLG SPEC IGE CRUDE XTRC EA: CPT

## 2021-09-07 LAB
A ALTERNATA IGE QN: <0.1 KU(A)/L
A FUMIGATUS IGE QN: <0.1 KU(A)/L
C HERBARUM IGE QN: <0.1 KU(A)/L
CALIF WALNUT POLN IGE QN: <0.1 KU(A)/L
CAT DANDER IGG QN: <0.1 KU(A)/L
CEDAR IGE QN: <0.1 KU(A)/L
COCKSFOOT IGE QN: <0.1 KU(A)/L
COMMON RAGWEED IGE QN: 0.15 KU(A)/L
COTTONWOOD IGE QN: <0.1 KU(A)/L
D FARINAE IGE QN: 1.46 KU(A)/L
D PTERONYSS IGE QN: 1.33 KU(A)/L
DOG DANDER+EPITH IGE QN: <0.1 KU(A)/L
E PURPURASCENS IGE QN: <0.1 KU(A)/L
EAST WHITE PINE IGE QN: <0.1 KU(A)/L
ENGL PLANTAIN IGE QN: <0.1 KU(A)/L
FIREBUSH IGE QN: <0.1 KU(A)/L
GIANT RAGWEED IGE QN: <0.1 KU(A)/L
GOOSEFOOT IGE QN: <0.1 KU(A)/L
JOHNSON GRASS IGE QN: <0.1 KU(A)/L
MAPLE IGE QN: <0.1 KU(A)/L
MUGWORT IGE QN: <0.1 KU(A)/L
NETTLE IGE QN: <0.1 KU(A)/L
P NOTATUM IGE QN: <0.1 KU(A)/L
RED MULBERRY IGE QN: <0.1 KU(A)/L
SALTWORT IGE QN: <0.1 KU(A)/L
SHEEP SORREL IGE QN: <0.1 KU(A)/L
SILVER BIRCH IGE QN: <0.1 KU(A)/L
TIMOTHY IGE QN: <0.1 KU(A)/L
WHITE ASH IGE QN: <0.1 KU(A)/L
WHITE ELM IGE QN: <0.1 KU(A)/L
WHITE MULBERRY IGE QN: <0.1 KU(A)/L
WHITE OAK IGE QN: <0.1 KU(A)/L
WORMWOOD IGE QN: <0.1 KU(A)/L

## 2021-09-10 ENCOUNTER — TELEPHONE (OUTPATIENT)
Dept: FAMILY MEDICINE | Facility: CLINIC | Age: 41
End: 2021-09-10
Payer: COMMERCIAL

## 2021-09-10 NOTE — TELEPHONE ENCOUNTER
----- Message from SWEETIE Pepe CNP sent at 9/9/2021  8:37 PM CDT -----  Please notify the patient that it appears as though she has ragweed and dust allergies.  I can certainly refer her to an allergist to rule out food allergies as well.  Thanks.

## 2021-10-10 ENCOUNTER — HEALTH MAINTENANCE LETTER (OUTPATIENT)
Age: 41
End: 2021-10-10

## 2021-10-13 DIAGNOSIS — L50.9 URTICARIA: Primary | ICD-10-CM

## 2021-11-04 ENCOUNTER — LAB (OUTPATIENT)
Dept: FAMILY MEDICINE | Facility: CLINIC | Age: 41
End: 2021-11-04
Attending: PHYSICIAN ASSISTANT
Payer: COMMERCIAL

## 2021-11-04 ENCOUNTER — E-VISIT (OUTPATIENT)
Dept: URGENT CARE | Facility: CLINIC | Age: 41
End: 2021-11-04
Payer: COMMERCIAL

## 2021-11-04 DIAGNOSIS — Z20.822 SUSPECTED COVID-19 VIRUS INFECTION: Primary | ICD-10-CM

## 2021-11-04 DIAGNOSIS — Z20.822 SUSPECTED COVID-19 VIRUS INFECTION: ICD-10-CM

## 2021-11-04 LAB — SARS-COV-2 RNA RESP QL NAA+PROBE: NEGATIVE

## 2021-11-04 PROCEDURE — 99421 OL DIG E/M SVC 5-10 MIN: CPT | Performed by: PHYSICIAN ASSISTANT

## 2021-11-04 PROCEDURE — U0005 INFEC AGEN DETEC AMPLI PROBE: HCPCS

## 2021-11-04 PROCEDURE — U0003 INFECTIOUS AGENT DETECTION BY NUCLEIC ACID (DNA OR RNA); SEVERE ACUTE RESPIRATORY SYNDROME CORONAVIRUS 2 (SARS-COV-2) (CORONAVIRUS DISEASE [COVID-19]), AMPLIFIED PROBE TECHNIQUE, MAKING USE OF HIGH THROUGHPUT TECHNOLOGIES AS DESCRIBED BY CMS-2020-01-R: HCPCS

## 2021-11-04 NOTE — PATIENT INSTRUCTIONS
Dear Bonnie Townsend,    Your symptoms show that you may have coronavirus (COVID-19). This illness can cause fever, cough and trouble breathing. Many people get a mild case and get better on their own. Some people can get very sick.    Will I be tested for COVID-19?  We would like to test you for Covid-19 virus. I have placed orders for this test.     To schedule: go to your [employer] home page and scroll down to the section that says  You have an appointment that needs to be scheduled  and click the large green button that says  Schedule Now  and follow the steps to find the next available openings.    If you are unable to complete these [employer] scheduling steps, please call [phone] to schedule your testing.     Return to work/school/ guidance:  Please let your workplace manager and staffing office know when your quarantine ends     We can t give you an exact date as it depends on the above. You can calculate this on your own or work with your manager/staffing office to calculate this. (For example if you were exposed on 10/4, you would have to quarantine for 14 full days. That would be through 10/18. You could return on 10/19.)      If you receive a positive COVID-19 test result, follow the guidance of the those who are giving you the results. Usually the return to work is 10 (or in some cases 20 days from symptom onset.) If you work at [facility], you must also be cleared by Employee Occupational Health and Safety to return to work.        If you receive a negative COVID-19 test result and did not have a high risk exposure to someone with a known positive COVID-19 test, you can return to work once you're free of fever for 24 hours without fever-reducing medication and your symptoms are improving or resolved.      If you receive a negative COVID-19 test and If you had a high risk exposure to someone who has tested positive for COVID-19 then you can return to work 14 days after your last contact  with the positive individual    Note: If you have ongoing exposure to the covid positive person, this quarantine period may be more than 14 days. (For example, if you are continued to be exposed to your child who tested positive and cannot isolate from them, then the quarantine of 7-14 days can't start until your child is no longer contagious. This is typically 10 days from onset of the child's symptoms. So the total duration may be 17-24 days in this case.)    Sign up for [employer].   We know it's scary to hear that you might have COVID-19. We want to track your symptoms to make sure you're okay over the next 2 weeks. Please look for an email from [employer]--this is a free, online program that we'll use to keep in touch. To sign up, follow the link in the email you will receive. Learn more at [email or web address]    How can I take care of myself?    Get lots of rest. Drink extra fluids (unless a doctor has told you not to)    Take Tylenol (acetaminophen) or ibuprofen for fever or pain. If you have liver or kidney problems, ask your family doctor if it's okay to take Tylenol o ibuprofen    If you have other health problems (like cancer, heart failure, an organ transplant or severe kidney disease): Call your specialty clinic if you don't feel better in the next 2 days.    Know when to call 911. Emergency warning signs include:  o Trouble breathing or shortness of breath  o Pain or pressure in the chest that doesn't go away  o Feeling confused like you haven't felt before, or not being able to wake up  o Bluish-colored lips or face    Where can I get more information?  [facility] - About COVID-19:   [email or web address]    CDC - What to Do If You're Sick:   www.cdc.gov/coronavirus/2019-ncov/about/steps-when-sick.html    November 4, 2021  RE:  Bonnie Townsend                                                                                                                  [address]  [place] [address]      To whom it may concern:    I evaluated Bonnie Townsend on November 4, 2021. Bonnie Townsend should be excused from work/school.     They should let their workplace manager and staffing office know when their quarantine ends.    We can not give an exact date as it depends on the information below. They can calculate this on their own or work with their manager/staffing office to calculate this. (For example if they were exposed on 10/04, they would have to quarantine for 14 full days. That would be through 10/18. They could return on 10/19.)    Quarantine Guidelines:      If patient receives a positive COVID-19 test result, they should follow the guidance of those who are giving the results. Usually the return to work is 10 (or in some cases 20 days from symptom onset.) If they work at [employer], they must be cleared by Employee Occupational Health and Safety to return to work.        If patient receives a negative COVID-19 test result and did not have a high risk exposure to someone with a known positive COVID-19 test, they can return to work once they're free of fever for 24 hours without fever-reducing medication and their symptoms are improving or resolved.      If patient receives a negative COVID-19 test and if they had a high risk exposure to someone who has tested positive for COVID-19 then they can return to work 14 days after their last contact with the positive individual    Note: If there is ongoing exposure to the covid positive person, this quarantine period may be longer than 14 days. (For example, if they are continually exposed to their child, who tested positive and cannot isolate from them, then the quarantine of 7-14 days can't start until their child is no longer contagious. This is typically 10 days from onset to the child's symptoms. So the total duration may be 17-24 days in this case.)     Sincerely,  Lawrence Espinoza PA-C

## 2021-11-08 ENCOUNTER — E-VISIT (OUTPATIENT)
Dept: FAMILY MEDICINE | Facility: CLINIC | Age: 41
End: 2021-11-08
Payer: COMMERCIAL

## 2021-11-08 DIAGNOSIS — B96.89 ACUTE BACTERIAL SINUSITIS: Primary | ICD-10-CM

## 2021-11-08 DIAGNOSIS — J01.90 ACUTE BACTERIAL SINUSITIS: Primary | ICD-10-CM

## 2021-11-08 PROCEDURE — 99421 OL DIG E/M SVC 5-10 MIN: CPT | Performed by: NURSE PRACTITIONER

## 2021-11-08 NOTE — PATIENT INSTRUCTIONS
Sinusitis (Antibiotic Treatment)    The sinuses are air-filled spaces within the bones of the face. They connect to the inside of the nose. Sinusitis is an inflammation of the tissue that lines the sinuses. Sinusitis can occur during a cold. It can also happen due to allergies to pollens and other particles in the air. Sinusitis can cause symptoms of sinus congestion and a feeling of fullness. A sinus infection causes fever, headache, and facial pain. There is often green or yellow fluid draining from the nose or into the back of the throat (post-nasal drip). You have been given antibiotics to treat this condition.   Home care    Take the full course of antibiotics as instructed. Don't stop taking them, even when you feel better.    Drink plenty of water, hot tea, and other liquids as directed by the healthcare provider. This may help thin nasal mucus. It also may help your sinuses drain fluids.    Heat may help soothe painful areas of your face. Use a towel soaked in hot water. Or,  the shower and direct the warm spray onto your face. Using a vaporizer along with a menthol rub at night may also help soothe symptoms.     An expectorant with guaifenesin may help thin nasal mucus and help your sinuses drain fluids. Talk with your provider or pharmacists before taking an over-the-counter (OTC) medicine if you have any questions about it or its side effects..    You can use an OTC decongestant, unless a similar medicine was prescribed to you. Nasal sprays work the fastest. Use one that contains phenylephrine or oxymetazoline. First blow your nose gently. Then use the spray. Don't use these medicines more often than directed on the label. If you do, your symptoms may get worse. You may also take pills that contain pseudoephedrine. Don t use products that combine multiple medicines. This is because side effects may be increased. Read labels. You can also ask the pharmacist for help. (People with high blood  pressure should not use decongestants. They can raise blood pressure.) Talk with your provider or pharmacist if you have any questions about the medicine..    OTC antihistamines may help if allergies contributed to your sinusitis. Talk with your provider or pharmacist if you have any questions about the medicine..    Don't use nasal rinses or irrigation during an acute sinus infection, unless your healthcare provider tells you to. Rinsing may spread the infection to other areas in your sinuses.    Use acetaminophen or ibuprofen to control pain, unless another pain medicine was prescribed to you. If you have chronic liver or kidney disease or ever had a stomach ulcer, talk with your healthcare provider before using these medicines. Never give aspirin to anyone under age 18 who is ill with a fever. It may cause severe liver damage.    Don't smoke. This can make symptoms worse.    Follow-up care  Follow up with your healthcare provider, or as advised.   When to seek medical advice  Call your healthcare provider if any of these occur:     Facial pain or headache that gets worse    Stiff neck    Unusual drowsiness or confusion    Swelling of your forehead or eyelids    Symptoms don't go away in 10 days    Vision problems, such as blurred or double vision    Fever of 100.4 F (38 C) or higher, or as directed by your healthcare provider  Call 911  Call 911 if any of these occur:     Seizure    Trouble breathing    Feeling dizzy or faint    Fingernails, skin or lips look blue, purple , or gray  Prevention  Here are steps you can take to help prevent an infection:     Keep good hand washing habits.    Don t have close contact with people who have sore throats, colds, or other upper respiratory infections.    Don t smoke, and stay away from secondhand smoke.    Stay up to date with of your vaccines.  [employer] last reviewed this educational content on 12/1/2019 2000-2021 [employer]. All rights reserved. This  information is not intended as a substitute for professional medical care. Always follow your healthcare professional's instructions.        Dear Bonnie Townsend    After reviewing your responses, I've been able to diagnose you with?a sinus infection caused by bacteria.?     Based on your responses and diagnosis, I have prescribed Augmentin to treat your symptoms. I have sent this to your pharmacy.?     It is also important to stay well hydrated, get lots of rest and take over-the-counter decongestants,?tylenol?or ibuprofen if you?are able to?take those medications per your primary care provider to help relieve discomfort.?     It is important that you take?all of?your prescribed medication even if your symptoms are improving after a few doses.? Taking?all of?your medicine helps prevent the symptoms from returning.?     If your symptoms worsen, you develop severe headache, vomiting, high fever (>102), or are not improving in 7 days, please contact your primary care provider for an appointment or visit any of our convenient Walk-in Care or Urgent Care Centers to be seen which can be found on our website?here.?     Thanks again for choosing?us?as your health care partner,?   ?  SWEETIE Pepe CNP?

## 2021-11-09 DIAGNOSIS — Z20.822 SUSPECTED 2019 NOVEL CORONAVIRUS INFECTION: Primary | ICD-10-CM

## 2021-11-12 DIAGNOSIS — B37.31 YEAST VAGINITIS: Primary | ICD-10-CM

## 2021-11-12 RX ORDER — FLUCONAZOLE 150 MG/1
150 TABLET ORAL ONCE
Qty: 1 TABLET | Refills: 0 | Status: SHIPPED | OUTPATIENT
Start: 2021-11-12 | End: 2021-11-12

## 2021-12-06 ENCOUNTER — E-VISIT (OUTPATIENT)
Dept: FAMILY MEDICINE | Facility: CLINIC | Age: 41
End: 2021-12-06
Payer: COMMERCIAL

## 2021-12-06 DIAGNOSIS — R51.9 ACUTE NONINTRACTABLE HEADACHE, UNSPECIFIED HEADACHE TYPE: Primary | ICD-10-CM

## 2021-12-06 PROCEDURE — 99207 PR NON-BILLABLE SERV PER CHARTING: CPT | Performed by: NURSE PRACTITIONER

## 2021-12-06 NOTE — PATIENT INSTRUCTIONS
Thank you for choosing us for your care. I think an in-clinic visit would be best next steps based on your symptoms. Please schedule a clinic appointment; you won t be charged for this eVisit.  You can also consider going to urgent care or ER.     You can schedule an appointment right here in [facility], or call [phone]

## 2021-12-21 DIAGNOSIS — Z11.1 SCREENING EXAMINATION FOR PULMONARY TUBERCULOSIS: Primary | ICD-10-CM

## 2022-01-27 ENCOUNTER — OFFICE VISIT (OUTPATIENT)
Dept: FAMILY MEDICINE | Facility: CLINIC | Age: 42
End: 2022-01-27
Payer: COMMERCIAL

## 2022-01-27 ENCOUNTER — TELEPHONE (OUTPATIENT)
Dept: FAMILY MEDICINE | Facility: CLINIC | Age: 42
End: 2022-01-27

## 2022-01-27 VITALS
OXYGEN SATURATION: 99 % | HEART RATE: 89 BPM | WEIGHT: 136.02 LBS | BODY MASS INDEX: 24.68 KG/M2 | RESPIRATION RATE: 18 BRPM | DIASTOLIC BLOOD PRESSURE: 81 MMHG | TEMPERATURE: 97.1 F | SYSTOLIC BLOOD PRESSURE: 125 MMHG

## 2022-01-27 DIAGNOSIS — R52 BODY ACHES: ICD-10-CM

## 2022-01-27 DIAGNOSIS — R11.0 NAUSEA: ICD-10-CM

## 2022-01-27 DIAGNOSIS — R53.83 FATIGUE, UNSPECIFIED TYPE: Primary | ICD-10-CM

## 2022-01-27 LAB
ALBUMIN SERPL-MCNC: 4.3 G/DL (ref 3.5–5)
ALP SERPL-CCNC: 80 U/L (ref 45–120)
ALT SERPL W P-5'-P-CCNC: 16 U/L (ref 0–45)
ANION GAP SERPL CALCULATED.3IONS-SCNC: 10 MMOL/L (ref 5–18)
AST SERPL W P-5'-P-CCNC: 14 U/L (ref 0–40)
BASOPHILS # BLD AUTO: 0.1 10E3/UL (ref 0–0.2)
BASOPHILS NFR BLD AUTO: 1 %
BILIRUB SERPL-MCNC: 0.4 MG/DL (ref 0–1)
BUN SERPL-MCNC: 12 MG/DL (ref 8–22)
C REACTIVE PROTEIN LHE: 0.1 MG/DL (ref 0–0.8)
CALCIUM SERPL-MCNC: 9.3 MG/DL (ref 8.5–10.5)
CHLORIDE BLD-SCNC: 102 MMOL/L (ref 98–107)
CO2 SERPL-SCNC: 27 MMOL/L (ref 22–31)
CREAT SERPL-MCNC: 0.71 MG/DL (ref 0.6–1.1)
EOSINOPHIL # BLD AUTO: 0.1 10E3/UL (ref 0–0.7)
EOSINOPHIL NFR BLD AUTO: 1 %
ERYTHROCYTE [DISTWIDTH] IN BLOOD BY AUTOMATED COUNT: 11.7 % (ref 10–15)
ERYTHROCYTE [SEDIMENTATION RATE] IN BLOOD BY WESTERGREN METHOD: 6 MM/HR (ref 0–20)
FLUAV AG SPEC QL IA: NEGATIVE
FLUBV AG SPEC QL IA: NEGATIVE
GFR SERPL CREATININE-BSD FRML MDRD: >90 ML/MIN/1.73M2
GLUCOSE BLD-MCNC: 127 MG/DL (ref 70–125)
HCT VFR BLD AUTO: 44.7 % (ref 35–47)
HGB BLD-MCNC: 15.1 G/DL (ref 11.7–15.7)
HOLD SPECIMEN: NORMAL
IMM GRANULOCYTES # BLD: 0 10E3/UL
IMM GRANULOCYTES NFR BLD: 0 %
LYMPHOCYTES # BLD AUTO: 2 10E3/UL (ref 0.8–5.3)
LYMPHOCYTES NFR BLD AUTO: 20 %
MCH RBC QN AUTO: 29.4 PG (ref 26.5–33)
MCHC RBC AUTO-ENTMCNC: 33.8 G/DL (ref 31.5–36.5)
MCV RBC AUTO: 87 FL (ref 78–100)
MONOCYTES # BLD AUTO: 0.5 10E3/UL (ref 0–1.3)
MONOCYTES NFR BLD AUTO: 5 %
NEUTROPHILS # BLD AUTO: 7.6 10E3/UL (ref 1.6–8.3)
NEUTROPHILS NFR BLD AUTO: 74 %
PLATELET # BLD AUTO: 343 10E3/UL (ref 150–450)
POTASSIUM BLD-SCNC: 3.4 MMOL/L (ref 3.5–5)
PROT SERPL-MCNC: 7.4 G/DL (ref 6–8)
RBC # BLD AUTO: 5.13 10E6/UL (ref 3.8–5.2)
SODIUM SERPL-SCNC: 139 MMOL/L (ref 136–145)
WBC # BLD AUTO: 10.3 10E3/UL (ref 4–11)

## 2022-01-27 PROCEDURE — U0005 INFEC AGEN DETEC AMPLI PROBE: HCPCS | Performed by: FAMILY MEDICINE

## 2022-01-27 PROCEDURE — 85652 RBC SED RATE AUTOMATED: CPT | Performed by: FAMILY MEDICINE

## 2022-01-27 PROCEDURE — 87804 INFLUENZA ASSAY W/OPTIC: CPT | Performed by: FAMILY MEDICINE

## 2022-01-27 PROCEDURE — 99214 OFFICE O/P EST MOD 30 MIN: CPT | Performed by: FAMILY MEDICINE

## 2022-01-27 PROCEDURE — 85025 COMPLETE CBC W/AUTO DIFF WBC: CPT | Performed by: FAMILY MEDICINE

## 2022-01-27 PROCEDURE — U0003 INFECTIOUS AGENT DETECTION BY NUCLEIC ACID (DNA OR RNA); SEVERE ACUTE RESPIRATORY SYNDROME CORONAVIRUS 2 (SARS-COV-2) (CORONAVIRUS DISEASE [COVID-19]), AMPLIFIED PROBE TECHNIQUE, MAKING USE OF HIGH THROUGHPUT TECHNOLOGIES AS DESCRIBED BY CMS-2020-01-R: HCPCS | Performed by: FAMILY MEDICINE

## 2022-01-27 PROCEDURE — 80053 COMPREHEN METABOLIC PANEL: CPT | Performed by: FAMILY MEDICINE

## 2022-01-27 PROCEDURE — 86140 C-REACTIVE PROTEIN: CPT | Performed by: FAMILY MEDICINE

## 2022-01-27 PROCEDURE — 36415 COLL VENOUS BLD VENIPUNCTURE: CPT | Performed by: FAMILY MEDICINE

## 2022-01-27 RX ORDER — LISDEXAMFETAMINE DIMESYLATE 40 MG/1
CAPSULE ORAL
COMMUNITY
Start: 2022-01-13

## 2022-01-27 RX ORDER — FLUTICASONE PROPIONATE 50 MCG
SPRAY, SUSPENSION (ML) NASAL
COMMUNITY
Start: 2021-03-23 | End: 2022-01-27

## 2022-01-27 NOTE — PROGRESS NOTES
Assessment:       Fatigue, unspecified type  - Symptomatic; Yes; 1/23/2022 COVID-19 Virus (Coronavirus) by PCR Nose  - Influenza A & B Antigen - Clinic Collect  - CBC with platelets differential  - Comprehensive metabolic panel  - CRP, inflammation  - ESR: Erythrocyte sedimentation rate  - CBC with platelets differential  - Comprehensive metabolic panel  - CRP, inflammation  - ESR: Erythrocyte sedimentation rate    Nausea    - Comprehensive metabolic panel  - Comprehensive metabolic panel    Body aches    - Symptomatic; Yes; 1/23/2022 COVID-19 Virus (Coronavirus) by PCR Nose  - Influenza A & B Antigen - Clinic Collect  - CRP, inflammation  - ESR: Erythrocyte sedimentation rate  - CRP, inflammation  - ESR: Erythrocyte sedimentation rate         Plan:     Lab work ordered as noted above, all of which has been discussed with patient except for COVID-19 test which is pending.  CBC, CMP, CRP, sed rate, influenza screen, all unremarkable.  Unclear as to the etiology of her symptoms.  She will remain isolated until COVID-19 test comes back.  Recommend Tylenol or ibuprofen as needed for discomfort body aches, rest, and fluids.  No indication for antibiotics at this time.  Recommend follow-up with her PCP if symptoms getting worse or not improving over the next 4 to 5 days.  Patient is agreeable with this plan.    MEDICATIONS:   Orders Placed This Encounter   Medications     DISCONTD: fluticasone (FLONASE) 50 MCG/ACT nasal spray     Sig: SHAKE LIQUID AND USE 2 SPRAYS IN EACH NOSTRIL DAILY     VYVANSE 40 MG capsule     Sig: TAKE 1 CAPSULE BY MOUTH EVERY DAY     Patient Instructions   You have tested negative for influenza and your complete blood count is entirely normal with a normal white blood count, platelet level, and hemoglobin.  Still waiting for the results of your COVID-19 test which should be back tomorrow as well as your complete metabolic panel and CRP and sed rate which are both markers of inflammation.  We will  call you if any of these are abnormal and need to be discussed further.  Your results should be viewable on my chart and all should be back likely within the next 24 hours.    It is hard to know what exactly is causing your symptoms and may represent a viral infection.  I do not see any indication for antibiotics at this time and would recommend getting plenty of rest, pushing fluids, and Tylenol as needed for discomfort.  Please follow-up with your primary care provider if your symptoms are getting worse or not improving over the next 4 to 5 days.      Subjective:       41 year old female presents for evaluation of a 4-day history of fatigue, body aches, chills, nausea, faint discomfort in her chest, and some dizziness off and on.  She denies any diarrhea or vomiting and has not had any nasal congestion or cough.  She does not think she has had a fever.  She has been vaccinated against COVID-19 x3 and did a home COVID-19 test yesterday which was negative.  She has not had any recent travel and no one around her is sick with similar symptoms.  She has not noticed any joint pain.  No shortness of breath or wheezing.  She denies any possibility of pregnancy.    Patient Active Problem List   Diagnosis     ADHD, predominantly inattentive type     Right hip pain     Insomnia     IUD contraception     Tobacco use disorder       Past Medical History:   Diagnosis Date     ADHD (attention deficit hyperactivity disorder)      Hip pain     right     PONV (postoperative nausea and vomiting)     Nausea       Past Surgical History:   Procedure Laterality Date     APPENDECTOMY       ARTHROSCOPY HIP Right 10/25/2016    Procedure: RIGHT HIP ARTHROSCOPY PINCER RESECTION LABRAL REPAIR CAPSULAR CLOSURE;  Surgeon: Myron Nunez MD;  Location: [facility];  Service:       SECTION       LAPAROSCOPY FOR ECTOPIC PREGNANCY         Current Outpatient Medications   Medication     levonorgestrel (MIRENA) 20 mcg/24 hr (5 years)  IUD     methylphenidate HCl (RITALIN) 10 MG tablet     omeprazole (PRILOSEC) 20 MG capsule     VYVANSE 40 MG capsule     No current facility-administered medications for this visit.       Allergies   Allergen Reactions     Pollen Extract Dizziness, Headache, Muscle Pain (Myalgia), Other (See Comments) and Tinnitus     Other reaction(s): Dizziness, Headache     Sulfa Drugs Unknown       Family History   Problem Relation Age of Onset     Hypertension Mother      Lung Cancer Father        Social History     Socioeconomic History     Marital status: Patient Declined     Spouse name: None     Number of children: None     Years of education: None     Highest education level: None   Occupational History     None   Tobacco Use     Smoking status: Former Smoker     Smokeless tobacco: Never Used     Tobacco comment: Social   Substance and Sexual Activity     Alcohol use: Yes     Alcohol/week: 2.0 - 3.0 standard drinks     Drug use: No     Sexual activity: Yes     Partners: Male     Birth control/protection: I.U.D.   Other Topics Concern     None   Social History Narrative     None     Social Determinants of Health     Financial Resource Strain: Not on file   Food Insecurity: Not on file   Transportation Needs: Not on file   Physical Activity: Not on file   Stress: Not on file   Social Connections: Not on file   Intimate Partner Violence: Not on file   Housing Stability: Not on file         Review of Systems  Pertinent items are noted in HPI.      Objective:       /81   Pulse 89   Temp 97.1  F (36.2  C) (Tympanic)   Resp 18   Wt 61.7 kg (136 lb 0.4 oz)   SpO2 99%   BMI 24.68 kg/m      General Appearance:      Alert, pleasant, cooperative, no distress, appears stated age   Head:    Normocephalic, without obvious abnormality, atraumatic   Eyes:    Conjunctiva/corneas clear   Ears:    Normal TM's without erythema or bulging. Normal external ear canals, both ears   Nose:   Nares normal, septum midline, mucosa normal,  no drainage    or sinus tenderness   Throat:   Lips, mucosa, and tongue normal; teeth and gums normal.  No tonsilar hypertrophy or exudate.   Neck:    Cardiovascular:   Supple, symmetrical, trachea midline, no adenopathy;     thyroid:  no enlargement/tenderness/nodules  Regular rate and rhythm, no murmurs, rubs, or gallops.   Lungs:    Abdomen:  Extremities:  Skin:         Clear to auscultation bilaterally without wheezes, rales, or rhonchi, respirations unlabored  Soft, nontender  Warm and well perfused  No rashes                           Results for orders placed or performed in visit on 01/27/22   Comprehensive metabolic panel     Status: Abnormal   Result Value Ref Range    Sodium 139 136 - 145 mmol/L    Potassium 3.4 (L) 3.5 - 5.0 mmol/L    Chloride 102 98 - 107 mmol/L    Carbon Dioxide (CO2) 27 22 - 31 mmol/L    Anion Gap 10 5 - 18 mmol/L    Urea Nitrogen 12 8 - 22 mg/dL    Creatinine 0.71 0.60 - 1.10 mg/dL    Calcium 9.3 8.5 - 10.5 mg/dL    Glucose 127 (H) 70 - 125 mg/dL    Alkaline Phosphatase 80 45 - 120 U/L    AST 14 0 - 40 U/L    ALT 16 0 - 45 U/L    Protein Total 7.4 6.0 - 8.0 g/dL    Albumin 4.3 3.5 - 5.0 g/dL    Bilirubin Total 0.4 0.0 - 1.0 mg/dL    GFR Estimate >90 >60 mL/min/1.73m2   CRP, inflammation     Status: Normal   Result Value Ref Range    CRP 0.1 0.0-<0.8 mg/dL   ESR: Erythrocyte sedimentation rate     Status: Normal   Result Value Ref Range    Erythrocyte Sedimentation Rate 6 0 - 20 mm/hr   CBC with platelets and differential     Status: None   Result Value Ref Range    WBC Count 10.3 4.0 - 11.0 10e3/uL    RBC Count 5.13 3.80 - 5.20 10e6/uL    Hemoglobin 15.1 11.7 - 15.7 g/dL    Hematocrit 44.7 35.0 - 47.0 %    MCV 87 78 - 100 fL    MCH 29.4 26.5 - 33.0 pg    MCHC 33.8 31.5 - 36.5 g/dL    RDW 11.7 10.0 - 15.0 %    Platelet Count 343 150 - 450 10e3/uL    % Neutrophils 74 %    % Lymphocytes 20 %    % Monocytes 5 %    % Eosinophils 1 %    % Basophils 1 %    % Immature Granulocytes 0 %     Absolute Neutrophils 7.6 1.6 - 8.3 10e3/uL    Absolute Lymphocytes 2.0 0.8 - 5.3 10e3/uL    Absolute Monocytes 0.5 0.0 - 1.3 10e3/uL    Absolute Eosinophils 0.1 0.0 - 0.7 10e3/uL    Absolute Basophils 0.1 0.0 - 0.2 10e3/uL    Absolute Immature Granulocytes 0.0 <=0.4 10e3/uL   Extra Red Top Tube     Status: None   Result Value Ref Range    Hold Specimen [facility]    Influenza A & B Antigen - Clinic Collect     Status: Normal    Specimen: Nose; Swab   Result Value Ref Range    Influenza A antigen Negative Negative    Influenza B antigen Negative Negative    Narrative    Test results must be correlated with clinical data. If necessary, results should be confirmed by a molecular assay or viral culture.   CBC with platelets differential     Status: None    Narrative    The following orders were created for panel order CBC with platelets differential.  Procedure                               Abnormality         Status                     ---------                               -----------         ------                     CBC with platelets and d...[[id number]]                      Final result                 Please view results for these tests on the individual orders.   Extra Tube     Status: None    Narrative    The following orders were created for panel order Extra Tube.  Procedure                               Abnormality         Status                     ---------                               -----------         ------                     Extra Red Top Tube[[id number]]                               Final result                 Please view results for these tests on the individual orders.       This note has been dictated using voice recognition software. Any grammatical or context distortions are unintentional and inherent to the software

## 2022-01-27 NOTE — LETTER
January 27, 2022      Bonnie Townsend  [address] [place] [address]        To Whom It May Concern:    Bonnie Townsend was seen on 1/27/2022.  Please excuse her 1/27/2022 due to illness.        Sincerely,        Libra Gibson MD

## 2022-01-27 NOTE — PATIENT INSTRUCTIONS
You have tested negative for influenza and your complete blood count is entirely normal with a normal white blood count, platelet level, and hemoglobin.  Still waiting for the results of your COVID-19 test which should be back tomorrow as well as your complete metabolic panel and CRP and sed rate which are both markers of inflammation.  We will call you if any of these are abnormal and need to be discussed further.  Your results should be viewable on my chart and all should be back likely within the next 24 hours.    It is hard to know what exactly is causing your symptoms and may represent a viral infection.  I do not see any indication for antibiotics at this time and would recommend getting plenty of rest, pushing fluids, and Tylenol as needed for discomfort.  Please follow-up with your primary care provider if your symptoms are getting worse or not improving over the next 4 to 5 days.

## 2022-01-27 NOTE — TELEPHONE ENCOUNTER
lmtcb- pt has apt with Colette Vitaly tomorrow that needs to be rescheduled. Please assist in rescheduling  when pt calls back

## 2022-01-27 NOTE — TELEPHONE ENCOUNTER
Patient called back. I offered to reschedule her appointment. Patient states that she is going to urgent care as she cannot wait to be seen.

## 2022-01-28 LAB — SARS-COV-2 RNA RESP QL NAA+PROBE: NEGATIVE

## 2022-05-03 ENCOUNTER — TRANSFERRED RECORDS (OUTPATIENT)
Dept: HEALTH INFORMATION MANAGEMENT | Facility: CLINIC | Age: 42
End: 2022-05-03
Payer: COMMERCIAL

## 2022-05-17 ENCOUNTER — TRANSFERRED RECORDS (OUTPATIENT)
Dept: HEALTH INFORMATION MANAGEMENT | Facility: CLINIC | Age: 42
End: 2022-05-17
Payer: COMMERCIAL

## 2022-07-17 ENCOUNTER — HEALTH MAINTENANCE LETTER (OUTPATIENT)
Age: 42
End: 2022-07-17

## 2022-08-14 ENCOUNTER — E-VISIT (OUTPATIENT)
Dept: FAMILY MEDICINE | Facility: CLINIC | Age: 42
End: 2022-08-14
Payer: MEDICAID

## 2022-08-14 DIAGNOSIS — N76.0 BACTERIAL VAGINOSIS: Primary | ICD-10-CM

## 2022-08-14 DIAGNOSIS — B96.89 BACTERIAL VAGINOSIS: Primary | ICD-10-CM

## 2022-08-14 PROCEDURE — 99207 PR NO CHARGE LOS: CPT | Performed by: NURSE PRACTITIONER

## 2022-08-14 RX ORDER — METRONIDAZOLE 500 MG/1
500 TABLET ORAL 2 TIMES DAILY
Qty: 14 TABLET | Refills: 0 | Status: SHIPPED | OUTPATIENT
Start: 2022-08-14 | End: 2022-08-21

## 2022-08-14 NOTE — PATIENT INSTRUCTIONS
Thank you for choosing us for your care. I have placed an order for a prescription so that you can start treatment. View your full visit summary for details by clicking on the link below. Your pharmacist will able to address any questions you may have about the medication.     If you re not feeling better within 2-3 days, please schedule an appointment.  You can schedule an appointment right here in [facility], or call [phone]  If the visit is for the same symptoms as your eVisit, we ll refund the cost of your eVisit if seen within seven days.      Bacterial Vaginosis    You have a vaginal infection called bacterial vaginosis (BV). Both good and bad bacteria are present in a healthy vagina. BV occurs when these bacteria get out of balance. The number of bad bacteria increase. And the number of good bacteria decrease. BV is linked with sexual activity, but it's not a sexually transmitted infection (STI).   BV may or may not cause symptoms. If symptoms do occur, they can include:     Thin, gray, milky-white, or sometimes green discharge    Unpleasant odor or  fishy  smell    Itching, burning, or pain in or around the vagina  It is not known what causes BV, but certain factors can make the problem more likely. These can include:     Douching    Spermicides    Use of antibiotics    Change in hormone levels with pregnancy, breastfeeding, or menopause    Having sex with a new partner    Having sex with more than one partner  BV will sometimes go away on its own. But treatment is often advised. This is because untreated BV can raise the risk of more serious health problems such as:     Pelvic inflammatory disease (PID)     delivery (giving birth to a baby early if you re pregnant)    HIV and some other sexually transmitted infections (STIs)    Infection after surgery on the reproductive organs  Home care  General care    BV is most often treated with medicines called antibiotics. These may be given as pills or  as a vaginal cream. If antibiotics are prescribed, be sure to use them exactly as directed. And complete all of the medicine, even if your symptoms go away.    Don't douche or having sex during treatment.    If you have sex with a female partner, ask your healthcare provider if she should also be treated.  Prevention    Don't douche.    Don't have sex. If you do have sex, then take steps to lower your risk:  ? Use condoms when having sex.  ? Limit the number of sex partners you have.    Follow-up care  Follow up with your healthcare provider, or as advised.   When to get medical advice  Call your healthcare provider right away if:     You have a fever of 100.4 F (38 C) or higher, or as directed by your provider.    Your symptoms get worse, or they don t go away within a few days of starting treatment.    You have new pain in the lower belly or pelvic region.    You have side effects that bother you or a reaction to the pills or cream you re prescribed.    You or any of your sex partners have new symptoms, such as a rash, joint pain, or sores.  [employer] last reviewed this educational content on 6/1/2020 2000-2021 [employer]. All rights reserved. This information is not intended as a substitute for professional medical care. Always follow your healthcare professional's instructions.

## 2022-09-24 ENCOUNTER — HEALTH MAINTENANCE LETTER (OUTPATIENT)
Age: 42
End: 2022-09-24

## 2022-11-05 ENCOUNTER — HOSPITAL ENCOUNTER (EMERGENCY)
Facility: CLINIC | Age: 42
Discharge: LEFT BEFORE DISCHARGE | End: 2022-11-05
Payer: COMMERCIAL

## 2022-11-07 ENCOUNTER — E-VISIT (OUTPATIENT)
Dept: FAMILY MEDICINE | Facility: CLINIC | Age: 42
End: 2022-11-07
Payer: MEDICAID

## 2022-11-07 DIAGNOSIS — S09.90XD INJURY OF HEAD, SUBSEQUENT ENCOUNTER: Primary | ICD-10-CM

## 2022-11-07 PROCEDURE — 99207 PR NON-BILLABLE SERV PER CHARTING: CPT | Performed by: NURSE PRACTITIONER

## 2022-11-08 NOTE — PATIENT INSTRUCTIONS
Thank you for choosing us for your care. I think an in-clinic visit would be best next steps based on your symptoms. Please schedule a clinic appointment; you won t be charged for this eVisit.      You can schedule an appointment right here in [facility], or call [phone]

## 2023-04-14 ENCOUNTER — E-VISIT (OUTPATIENT)
Dept: FAMILY MEDICINE | Facility: CLINIC | Age: 43
End: 2023-04-14
Payer: COMMERCIAL

## 2023-04-14 DIAGNOSIS — B37.31 YEAST VAGINITIS: Primary | ICD-10-CM

## 2023-04-14 PROCEDURE — 99421 OL DIG E/M SVC 5-10 MIN: CPT | Performed by: NURSE PRACTITIONER

## 2023-04-14 RX ORDER — FLUCONAZOLE 150 MG/1
150 TABLET ORAL ONCE
Qty: 1 TABLET | Refills: 0 | Status: SHIPPED | OUTPATIENT
Start: 2023-04-14 | End: 2023-04-14

## 2023-04-14 NOTE — PATIENT INSTRUCTIONS
Thank you for choosing us for your care. I have placed an order for a prescription so that you can start treatment. View your full visit summary for details by clicking on the link below. Your pharmacist will able to address any questions you may have about the medication.     If you're not feeling better within 5-7 days, please schedule an appointment.  You can schedule an appointment right here in [facility], or call [phone]  If the visit is for the same symptoms as your eVisit, we'll refund the cost of your eVisit if seen within seven days.

## 2023-07-30 ENCOUNTER — HEALTH MAINTENANCE LETTER (OUTPATIENT)
Age: 43
End: 2023-07-30

## 2023-11-30 ENCOUNTER — IMMUNIZATION (OUTPATIENT)
Dept: NURSING | Facility: CLINIC | Age: 43
End: 2023-11-30
Payer: COMMERCIAL

## 2023-11-30 PROCEDURE — 90686 IIV4 VACC NO PRSV 0.5 ML IM: CPT

## 2023-11-30 PROCEDURE — 90471 IMMUNIZATION ADMIN: CPT

## 2024-02-25 ENCOUNTER — HEALTH MAINTENANCE LETTER (OUTPATIENT)
Age: 44
End: 2024-02-25

## 2024-09-22 ENCOUNTER — HEALTH MAINTENANCE LETTER (OUTPATIENT)
Age: 44
End: 2024-09-22

## 2025-05-06 ENCOUNTER — VIRTUAL VISIT (OUTPATIENT)
Dept: URGENT CARE | Facility: CLINIC | Age: 45
End: 2025-05-06
Payer: COMMERCIAL

## 2025-05-06 ENCOUNTER — OFFICE VISIT (OUTPATIENT)
Dept: URGENT CARE | Facility: URGENT CARE | Age: 45
End: 2025-05-06
Payer: COMMERCIAL

## 2025-05-06 VITALS
DIASTOLIC BLOOD PRESSURE: 97 MMHG | WEIGHT: 140 LBS | RESPIRATION RATE: 16 BRPM | HEIGHT: 62 IN | HEART RATE: 88 BPM | TEMPERATURE: 98.2 F | SYSTOLIC BLOOD PRESSURE: 148 MMHG | BODY MASS INDEX: 25.76 KG/M2 | OXYGEN SATURATION: 98 %

## 2025-05-06 DIAGNOSIS — R07.0 THROAT PAIN: Primary | ICD-10-CM

## 2025-05-06 DIAGNOSIS — J02.9 PHARYNGITIS, UNSPECIFIED ETIOLOGY: Primary | ICD-10-CM

## 2025-05-06 LAB
DEPRECATED S PYO AG THROAT QL EIA: NEGATIVE
MONOCYTES NFR BLD AUTO: NEGATIVE %
S PYO DNA THROAT QL NAA+PROBE: NOT DETECTED

## 2025-05-06 PROCEDURE — 87651 STREP A DNA AMP PROBE: CPT | Performed by: PHYSICIAN ASSISTANT

## 2025-05-06 PROCEDURE — 99207 PR NON-BILLABLE SERV PER CHARTING: CPT

## 2025-05-06 PROCEDURE — 36415 COLL VENOUS BLD VENIPUNCTURE: CPT | Performed by: PHYSICIAN ASSISTANT

## 2025-05-06 PROCEDURE — 86308 HETEROPHILE ANTIBODY SCREEN: CPT | Performed by: PHYSICIAN ASSISTANT

## 2025-05-06 PROCEDURE — 99213 OFFICE O/P EST LOW 20 MIN: CPT | Performed by: PHYSICIAN ASSISTANT

## 2025-05-06 PROCEDURE — 3080F DIAST BP >= 90 MM HG: CPT | Performed by: PHYSICIAN ASSISTANT

## 2025-05-06 PROCEDURE — 3077F SYST BP >= 140 MM HG: CPT | Performed by: PHYSICIAN ASSISTANT

## 2025-05-06 NOTE — LETTER
[facility] CARE [facility]  [address] [address]  Phone: [phone]  Fax: [phone]    May 6, 2025        Bonnie Townsend  [facility] [place] [address]          To whom it may concern:    RE: Bonnie Townsend    She is excused from work for 5/5/2025 - 5/11/2025.  May return sooner as tolerated.    Please contact me for questions or concerns.      Sincerely,      Mabel Mckeon

## 2025-05-06 NOTE — PROGRESS NOTES
Urgent Care Clinic Visit    Chief Complaint   Patient presents with    Sick     Started with a sore throat on Friday feels really full some ear pain cough headache green mucus when swallowing right shoulder and right side of chest

## 2025-05-06 NOTE — PATIENT INSTRUCTIONS
Cough    You were seen today for a cough. This is likely due to a virus and will improve over the next 1-2 weeks on its own.    Symptom management:  - Drink plenty of non-caffeinated fluids  - Avoid smoke exposure  - May use tylenol or ibuprofen for discomfort  - Drink a warm non-caffeinated tea with honey  - Place a warm humidifier in your bedroom at night  - [employer]'s VaporRub    Reasons to return for re-evaluation:  - Develop a fever 100.4 or higher, current fever worsens, or fever does not improve in 72 hours  - Difficulty breathing or shortness of breath  - Cough continues to worsen including coughing up blood or coughing up thick, colored phlegm  - Unable to tolerate fluids    Otherwise, if symptoms have not improved in 7 days, follow-up with your primary care provider.

## 2025-05-06 NOTE — PROGRESS NOTES
Video visit:  Start time: 12:59 PM  Stop time: 1:03 PM  Duration: 4 minutes  Patient location: At home  Provider location: [facility] virtual provider (remote).  Platform used for video visit: Michael        CHIEF COMPLAINT: Sore throat, chest pain      HPI: Patient is a 44-year-old female whose been ill for 5 days.  She initially developed a sore throat with swollen glands on Friday.  The sore throat is somewhat better but last night she developed chest pain with swallowing.  No difficulty breathing.      ROS: See HPI otherwise normal.    Allergies   Allergen Reactions    Pollen Extract Dizziness, Headache, Muscle Pain (Myalgia), Other (See Comments) and Tinnitus     Other reaction(s): Dizziness, Headache    Sulfa Antibiotics Unknown      Current Outpatient Medications   Medication Sig Dispense Refill    levonorgestrel (MIRENA) 20 mcg/24 hr (5 years) IUD [LEVONORGESTREL (MIRENA) 20 MCG/24 HR (5 YEARS) IUD] 1 each by Intrauterine route once.      methylphenidate HCl (RITALIN) 10 MG tablet [METHYLPHENIDATE HCL (RITALIN) 10 MG TABLET] Take 10 mg by mouth as needed.      omeprazole (PRILOSEC) 20 MG capsule [OMEPRAZOLE (PRILOSEC) 20 MG CAPSULE] Take 1 capsule (20 mg total) by mouth daily before breakfast. 14 capsule 0    VYVANSE 40 MG capsule TAKE 1 CAPSULE BY MOUTH EVERY DAY           PE: No acute distress.  Alert and oriented.  No dysphonia.  She is nondyspneic appearing speaking in full sentences.        TREATMENT: None.      ASSESSMENT: Constellation of symptoms somewhat confusing best treated with face-to-face appointment.  I directed patient to come into urgent care and she will come to the [facility].  Do not feel the chest discomfort is an acute process requiring urgent evaluation but have directed patient to come into urgent care today.      DIAGNOSIS: Pharyngitis.  Chest pain.      PLAN: Patient is directed to urgent care.  She plans to come into the [facility] today.

## 2025-05-06 NOTE — PROGRESS NOTES
Assessment & Plan:      Problem List Items Addressed This Visit    None  Visit Diagnoses       Throat pain    -  Primary    Relevant Orders    Streptococcus A Rapid Screen w/Reflex to PCR - Clinic Collect    Mononucleosis screen          Medical Decision Making  Patient presents with sore throat, cough, and sinus congestion progressing over the last 5 to 6 days.  Rapid strep and mononucleosis test are negative at this time.  Symptoms are consistent with a viral upper respiratory infection.  Continue with conservative measures.  Provided note for work as requested.  Discussed treatment and symptomatic care.  Allergies and medication interactions reviewed.  Discussed signs of worsening symptoms and when to follow-up with PCP if no symptom improvement.     Subjective:      Bonnie Townsend is a 44 year old female here for evaluation of sore throat, cough, and sinus congestion.  Onset of symptoms was 5 days ago with gradual worsening since then.  Patient initially started with severe sore throat.  Sore throat is improved some and patient is now getting increased cough and nasal congestion.  Patient also started to note voice loss.  She is requesting a note for work.     The following portions of the patient's history were reviewed and updated as appropriate: allergies, current medications, and problem list.     Review of Systems  Pertinent items are noted in HPI.    Allergies  Allergies   Allergen Reactions    Pollen Extract Dizziness, Headache, Muscle Pain (Myalgia), Other (See Comments) and Tinnitus     Other reaction(s): Dizziness, Headache    Sulfa Antibiotics Unknown       Family History   Problem Relation Age of Onset    Hypertension Mother     Lung Cancer Father        Social History     Tobacco Use    Smoking status: Former    Smokeless tobacco: Never    Tobacco comments:     Social   Substance Use Topics    Alcohol use: Yes     Alcohol/week: 2.0 - 3.0 standard drinks of alcohol        Objective:      BP (!)  "148/97   Pulse 88   Temp 98.2  F (36.8  C) (Oral)   Resp 16   Ht 1.575 m (5' 2\")   Wt 63.5 kg (140 lb)   SpO2 98%   BMI 25.61 kg/m    General appearance - alert, well appearing, and in no distress and non-toxic  Ears - TMs intact with mucoid fluid and bulging bilaterally, erythema  Nose - normal and patent, no erythema, discharge or polyps  Mouth - mucous membranes moist, pharynx normal without lesions  Neck - supple, no significant adenopathy  Chest - clear to auscultation, no wheezes, rales or rhonchi, symmetric air entry  Heart - normal rate, regular rhythm, normal S1, S2, no murmurs, rubs, clicks or gallops     Lab & Imaging Results    No results found for any visits on 05/06/25.    I personally reviewed these results and discussed findings with the patient.    The use of [employer] dictation services was used to construct the content of this note; any grammatical errors are non-intentional. Please contact the author directly if you are in need of any clarification.       "